# Patient Record
Sex: FEMALE | Race: WHITE | NOT HISPANIC OR LATINO | Employment: OTHER | URBAN - METROPOLITAN AREA
[De-identification: names, ages, dates, MRNs, and addresses within clinical notes are randomized per-mention and may not be internally consistent; named-entity substitution may affect disease eponyms.]

---

## 2017-05-13 ENCOUNTER — HOSPITAL ENCOUNTER (EMERGENCY)
Facility: HOSPITAL | Age: 47
Discharge: HOME | End: 2017-05-13
Attending: EMERGENCY MEDICINE
Payer: MEDICAID

## 2017-05-13 VITALS
DIASTOLIC BLOOD PRESSURE: 62 MMHG | BODY MASS INDEX: 27.77 KG/M2 | OXYGEN SATURATION: 98 % | HEIGHT: 55 IN | RESPIRATION RATE: 18 BRPM | TEMPERATURE: 98 F | SYSTOLIC BLOOD PRESSURE: 129 MMHG | WEIGHT: 120 LBS | HEART RATE: 67 BPM

## 2017-05-13 DIAGNOSIS — T78.40XA ALLERGIC REACTION, INITIAL ENCOUNTER: Primary | ICD-10-CM

## 2017-05-13 PROCEDURE — 99283 EMERGENCY DEPT VISIT LOW MDM: CPT

## 2017-05-13 PROCEDURE — 63600175 PHARM REV CODE 636 W HCPCS: Performed by: EMERGENCY MEDICINE

## 2017-05-13 RX ORDER — PREDNISONE 50 MG/1
50 TABLET ORAL DAILY
Qty: 10 TABLET | Refills: 0 | Status: SHIPPED | OUTPATIENT
Start: 2017-05-13 | End: 2017-05-23

## 2017-05-13 RX ORDER — PREDNISONE 20 MG/1
60 TABLET ORAL
Status: COMPLETED | OUTPATIENT
Start: 2017-05-13 | End: 2017-05-13

## 2017-05-13 RX ADMIN — PREDNISONE 60 MG: 20 TABLET ORAL at 07:05

## 2017-05-13 NOTE — ED AVS SNAPSHOT
[facility] -   [address]  [place] [address]               Penny Pereira   2017  6:56 AM   ED    Description:  Female : 1970   Department:  [facility] -            Your Care was Coordinated By:     Provider Role From To    Wale Luna MD Attending Provider 17 0706 --      Reason for Visit     Allergic Reaction           Diagnoses this Visit        Comments    Allergic reaction, initial encounter    -  Primary       ED Disposition     None           To Do List           Follow-up Information     Follow up with Rob Price MD In 1 day.    Specialty:  Family Medicine    Why:  Patient should return to the ED for any concerns or worsening of condition.    Contact information:     [facility]  [address]  [place] [address]  [phone]         These Medications        Disp Refills Start End    predniSONE (DELTASONE) 50 MG Tab 10 tablet 0 2017    Take 1 tablet (50 mg total) by mouth once daily. - Oral    Pharmacy: [facility] [address] [place] [address] 16 AT [facility] [place] [place] [address] [place] [address] Ph #: [phone]         [facility]     [facility] -  Assistance  Unless otherwise directed by your provider, please contact [facility], our nurse care line that is available for  assistance.     Registered nurses in the [facility] provide: appointment scheduling, clinical advisement, health education, and other advisory services.  Call: [phone] (toll free)               Medications           Message regarding Medications     Verify the changes and/or additions to your medication regime listed below are the same as discussed with your clinician today.  If any of these changes or additions are incorrect, please notify your healthcare provider.        START taking these NEW medications        Refills    predniSONE (DELTASONE) 50 MG Tab 0     "Sig: Take 1 tablet (50 mg total) by mouth once daily.    Class: Print    Route: Oral      These medications were administered today        Dose Freq    predniSONE tablet 60 mg 60 mg ED 1 Time    Sig: Take 3 tablets (60 mg total) by mouth ED 1 Time.    Class: Normal    Route: Oral           Verify that the below list of medications is an accurate representation of the medications you are currently taking.  If none reported, the list may be blank. If incorrect, please contact your healthcare provider. Carry this list with you in case of emergency.           Current Medications     sumatriptan (IMITREX) 50 MG tablet Take 50 mg by mouth daily as needed for Migraine.    predniSONE (DELTASONE) 50 MG Tab Take 1 tablet (50 mg total) by mouth once daily.    predniSONE tablet 60 mg Take 3 tablets (60 mg total) by mouth ED 1 Time.           Clinical Reference Information           Your Vitals Were     BP Pulse Temp Resp Height Weight    142/53 (BP Location: Right arm, Patient Position: Sitting) 72 98.1 °F (36.7 °C) (Oral) 16 4' 6" (1.372 m) 54.4 kg (120 lb)    SpO2 BMI             98% 28.93 kg/m2         Allergies as of 5/13/2017        Reactions    Demerol [Meperidine]     "I'm just sensitive to it."      Immunizations Administered on Date of Encounter - 5/13/2017     None      ED Micro, Lab, POCT     None      ED Imaging Orders     None        Discharge Instructions         Generalized Allergic Reaction (Other)  You are having an allergic reaction. Almost anything can cause one. Different people are allergic to different things. It is usually something that you ate or swallowed, came into contact with by getting or putting it on your skin or clothes, or something you breathed in the air. This can be very annoying and sometimes scary.  Most of us think of allergic reactions when we have a rash or itchy skin. Symptoms can include:  · Rash, hives, redness, welts, blisters  · Itching, burning, stinging, pain  · Dry, flaky, " cracking, scaly skin  · Swelling of the face, lips or other parts of the body  · Hoarse voice  · Trouble swallowing, feeling like your throat is closing  · Trouble breathing, wheezing  · Nausea, vomiting, diarrhea, stomach cramps  · Feeling faint or lightheaded, rapid heart rate  Sometimes the cause may be obvious. However, there are so many things that can cause a reaction that you may not be able to figure out. The most important things to help find your allergen are:  · Remembering when it started  · What you were doing at the time or just before that  · Any activities you were involved in  · Any new products or contacts  Here are some common causes, but remember almost anything can cause a reaction, and you may not even be aware that you came into contact with one of these things.  · Dust, mold, pollen  · Plants, such aspoison ivy and poison oak are common ones, but there are many others  · Animals  · Foods such as shrimp, shellfish, peanuts, milk products, gluten, eggs; also colorings, flavorings, additives  · Insect bites or stings such as bees, mosquitos, flees, ticks  · Medicines such as penicillin, sulfa drugs, amoxicillin, aspirin, ibuprofen; any medicine can cause a reaction  · Jewelry such as nickel, gold (new, or something youve worn for a while including zippers, and buttons)  · Latex such as in gloves, clothes, toys, balloons, or some tapes (some people allergic to latex may also have problems with foods like bananas, avocados, kiwi, papaya, or chestnuts)  · Lotions, perfumes, cosmetics, soaps, shampoos, skincare products, nail products  · Chemicals or dyes in clothing, linen, , hair dyes, soaps, iodine  Many viruses and common colds can cause a rash, but is not an allergic reaction. Sometimes it is hard to tell the difference between allergies, sensitivity and intolerance to something. This is especially true with food. Many things can cause diarrhea, vomiting, stomach cramps, and skin  irritation.  Home care    The goal of our treatment is to help relieve the symptoms, and get you feeling better. The rash will usually fade over several days, but can sometimes last a couple of weeks. Over the next couple of days, there may be times when it is gets a little worse, and then better again. Here are some things to do:  · If you know what you are allergic to, avoid it because future reactions could be worse than this one.  · Avoid tight clothing and anything that heats up your skin (hot showers/baths, direct sunlight) since heat will make itching worse.  · An ice pack will relieve local areas of intense itching and redness. Dont put the ice directly on the skin, because it can damage the skin. You can also ice put it in a plastic bag. Wrap it in something like a thin towel, chacho shirt, or cloth, or use a bag of frozen peas.  · Oral Benadryl (diphenhydramine) is an antihistamine available at drug and grocery stores. Unless a prescription antihistamine was given, Benadryl may be used to reduce itching if large areas of the skin are involved. It may make you sleepy, so be careful using it in the daytime or when going to school, working, or driving. [NOTE: Do not use Benadryl if you have glaucoma or if you are a man with trouble urinating due to an enlarged prostate.] There are antihistamines that causes less drowsiness and are good alternatives for daytime use. Ask your pharmacist for suggestions.  · Do not use Benadryl cream on your skin, because in some people it can cause a further reaction, and make you allergic to Benadryl.  · Try not to scratch. This can tear the skin and cause an infection.  · Using heat-steam to clean your home, using high-efficiency particulate (HEPA) vacuums and filters, avoiding food and pet triggers, exterminating cockroaches, and frequent house cleaning are a few of the strategies used to decrease allergic reactions.  Follow-up care  Follow up with your healthcare provider, or  as advised. If you had a severe reaction today, or if you have had several mild-moderate allergic reactions in the past, ask your doctor about allergy testing to find out what you are allergic to. If your reaction included dizziness, fainting or trouble breathing or swallowing, ask your doctor about carrying injectable epinephrine for home use.  Call 911  Call 911 if any of these occur:  · Trouble breathing or swallowing, wheezing  · Hoarse voice or trouble speaking  · Confused  · Very drowsy or trouble awakening  · Fainting or loss of consciousness  · Rapid heart rate  · Low blood pressure  · Feeling of doom  · Nausea, vomiting, abdominal pain, diarrhea  · Vomiting blood, or large amounts of blood in stool  · Seizure  When to seek medical advice  Call your healthcare provider right away if any of these occur:  · Spreading areas of itching, redness or swelling  · New or worse swelling in the face, eyelids, lips, mouth, throat or tongue  · Dizziness, weakness  · Signs of infection:  ¨ Spreading redness  ¨ Increased pain or swelling  ¨ Fever (1 degree above your normal temperature) lasting for 24 to 48 hours  Date Last Reviewed: 7/30/2015  © 5720-6515 [employer]. [address] [place] [address]. All rights reserved. This information is not intended as a substitute for professional medical care. Always follow your healthcare professional's instructions.          [facility] Sign-Up     Activating your [facility] account is as easy as 1-2-3!     1) Visit [email or web address], select Sign Up Now, enter this activation code and your date of birth, then select Next.  [email or web address]  Expires: 6/27/2017  7:13 AM      2) Create a username and password to use when you visit [facility] in the future and select a security question in case you lose your password and select Next.    3) Enter your e-mail address and click Sign Up!    Additional Information  If you have questions, please e-mail  [email or web address] or call [phone] to talk to our [facility] staff. Remember, [facility] is NOT to be used for urgent needs. For medical emergencies, dial 911.          [facility] complies with applicable Federal civil rights laws and does not discriminate on the basis of race, color, national origin, age, disability, or sex.        Language Assistance Services     ATTENTION: Language assistance services are available, free of charge. Please call [phone].      ATENCIÓN: Si habla español, tiene a ritchie disposición servicios gratuitos de asistencia lingüística. Llame al [phone].     CHÚ Ý: N?u b?n nói Ti?ng Vi?t, có các d?ch v? h? tr? ngôn ng? mi?n phí dành cho b?n. G?i s? [phone].

## 2017-05-13 NOTE — DISCHARGE INSTRUCTIONS
Generalized Allergic Reaction (Other)  You are having an allergic reaction. Almost anything can cause one. Different people are allergic to different things. It is usually something that you ate or swallowed, came into contact with by getting or putting it on your skin or clothes, or something you breathed in the air. This can be very annoying and sometimes scary.  Most of us think of allergic reactions when we have a rash or itchy skin. Symptoms can include:  · Rash, hives, redness, welts, blisters  · Itching, burning, stinging, pain  · Dry, flaky, cracking, scaly skin  · Swelling of the face, lips or other parts of the body  · Hoarse voice  · Trouble swallowing, feeling like your throat is closing  · Trouble breathing, wheezing  · Nausea, vomiting, diarrhea, stomach cramps  · Feeling faint or lightheaded, rapid heart rate  Sometimes the cause may be obvious. However, there are so many things that can cause a reaction that you may not be able to figure out. The most important things to help find your allergen are:  · Remembering when it started  · What you were doing at the time or just before that  · Any activities you were involved in  · Any new products or contacts  Here are some common causes, but remember almost anything can cause a reaction, and you may not even be aware that you came into contact with one of these things.  · Dust, mold, pollen  · Plants, such aspoison ivy and poison oak are common ones, but there are many others  · Animals  · Foods such as shrimp, shellfish, peanuts, milk products, gluten, eggs; also colorings, flavorings, additives  · Insect bites or stings such as bees, mosquitos, flees, ticks  · Medicines such as penicillin, sulfa drugs, amoxicillin, aspirin, ibuprofen; any medicine can cause a reaction  · Jewelry such as nickel, gold (new, or something youve worn for a while including zippers, and buttons)  · Latex such as in gloves, clothes, toys, balloons, or some tapes (some people  allergic to latex may also have problems with foods like bananas, avocados, kiwi, papaya, or chestnuts)  · Lotions, perfumes, cosmetics, soaps, shampoos, skincare products, nail products  · Chemicals or dyes in clothing, linen, , hair dyes, soaps, iodine  Many viruses and common colds can cause a rash, but is not an allergic reaction. Sometimes it is hard to tell the difference between allergies, sensitivity and intolerance to something. This is especially true with food. Many things can cause diarrhea, vomiting, stomach cramps, and skin irritation.  Home care    The goal of our treatment is to help relieve the symptoms, and get you feeling better. The rash will usually fade over several days, but can sometimes last a couple of weeks. Over the next couple of days, there may be times when it is gets a little worse, and then better again. Here are some things to do:  · If you know what you are allergic to, avoid it because future reactions could be worse than this one.  · Avoid tight clothing and anything that heats up your skin (hot showers/baths, direct sunlight) since heat will make itching worse.  · An ice pack will relieve local areas of intense itching and redness. Dont put the ice directly on the skin, because it can damage the skin. You can also ice put it in a plastic bag. Wrap it in something like a thin towel, chacho shirt, or cloth, or use a bag of frozen peas.  · Oral Benadryl (diphenhydramine) is an antihistamine available at drug and grocery stores. Unless a prescription antihistamine was given, Benadryl may be used to reduce itching if large areas of the skin are involved. It may make you sleepy, so be careful using it in the daytime or when going to school, working, or driving. [NOTE: Do not use Benadryl if you have glaucoma or if you are a man with trouble urinating due to an enlarged prostate.] There are antihistamines that causes less drowsiness and are good alternatives for daytime use. Ask  your pharmacist for suggestions.  · Do not use Benadryl cream on your skin, because in some people it can cause a further reaction, and make you allergic to Benadryl.  · Try not to scratch. This can tear the skin and cause an infection.  · Using heat-steam to clean your home, using high-efficiency particulate (HEPA) vacuums and filters, avoiding food and pet triggers, exterminating cockroaches, and frequent house cleaning are a few of the strategies used to decrease allergic reactions.  Follow-up care  Follow up with your healthcare provider, or as advised. If you had a severe reaction today, or if you have had several mild-moderate allergic reactions in the past, ask your doctor about allergy testing to find out what you are allergic to. If your reaction included dizziness, fainting or trouble breathing or swallowing, ask your doctor about carrying injectable epinephrine for home use.  Call 911  Call 911 if any of these occur:  · Trouble breathing or swallowing, wheezing  · Hoarse voice or trouble speaking  · Confused  · Very drowsy or trouble awakening  · Fainting or loss of consciousness  · Rapid heart rate  · Low blood pressure  · Feeling of doom  · Nausea, vomiting, abdominal pain, diarrhea  · Vomiting blood, or large amounts of blood in stool  · Seizure  When to seek medical advice  Call your healthcare provider right away if any of these occur:  · Spreading areas of itching, redness or swelling  · New or worse swelling in the face, eyelids, lips, mouth, throat or tongue  · Dizziness, weakness  · Signs of infection:  ¨ Spreading redness  ¨ Increased pain or swelling  ¨ Fever (1 degree above your normal temperature) lasting for 24 to 48 hours  Date Last Reviewed: 7/30/2015  © 7991-5881 [employer]. [address] [place] [address]. All rights reserved. This information is not intended as a substitute for professional medical care. Always follow your healthcare professional's  instructions.

## 2017-05-13 NOTE — ED PROVIDER NOTES
"SCRIBE #1 NOTE: I, Cosmo Irwin, am scribing for, and in the presence of, Wale Luna MD. I have scribed the entire note.      History      Chief Complaint   Patient presents with    Allergic Reaction     started last night with hives to both arms, both legs, and chest, itching, has not taken any medication for symptoms.       Review of patient's allergies indicates:   Allergen Reactions    Demerol [meperidine]      "I'm just sensitive to it."        HPI   HPI    2017, 7:12 AM   History obtained from the patient      History of Present Illness: Penny Pereira is a 47 y.o. female patient who presents to the Emergency Department for allergic reaction which onset gradually yesterday. Pt reports she ate at [employer] yesterday, which she does not normally do. States that she went to [place] afterwards, and felt like "something was biting her hands". Pt is c/o rash to BLE, BUE, stomach, and back. Symptoms are constant and moderate in severity.  No mitigating or exacerbating factors reported. Associated sxs include itching. Patient denies any fever, chills, trouble swallowing/breathing, tongue swelling, facial swelling, CP, SOB, N/V/D, weakness/numbness, dizziness, and all other sxs at this time. No prior tx. No further complaints or concerns at this time.       Arrival mode: Personal vehicle      PCP: Rob Price MD       Past Medical History:  Past Medical History:   Diagnosis Date    Migraine headache     Osteogenesis imperfecta        Past Surgical History:  Past Surgical History:   Procedure Laterality Date    BRAIN SURGERY       SECTION      x 3    HYSTERECTOMY      LEG SURGERY Bilateral          Family History:  History reviewed. No pertinent family history.    Social History:  Social History     Social History Main Topics    Smoking status: Never Smoker    Smokeless tobacco: Not given    Alcohol use No    Drug use: No    Sexual activity: Not given       ROS   Review of " Systems   Constitutional: Negative for chills and fever.   HENT: Negative for facial swelling, sore throat and trouble swallowing.         (-) tongue swelling   Respiratory: Negative for shortness of breath.    Cardiovascular: Negative for chest pain.   Gastrointestinal: Negative for nausea.   Genitourinary: Negative for dysuria.   Musculoskeletal: Negative for back pain.   Skin: Positive for rash (to BLE, BUE, and trunk).        (+) itching   Neurological: Negative for dizziness, weakness and numbness.   Hematological: Does not bruise/bleed easily.   All other systems reviewed and are negative.    Physical Exam    Initial Vitals   BP Pulse Resp Temp SpO2   05/13/17 0653 05/13/17 0653 05/13/17 0653 05/13/17 0653 05/13/17 0653   142/53 72 16 98.1 °F (36.7 °C) 98 %      Physical Exam  Nursing Notes and Vital Signs Reviewed.  Constitutional: Patient is in no acute distress. Awake and alert. Well-developed and well-nourished.  Head: Atraumatic. Normocephalic.  Eyes: PERRL. EOM intact. Conjunctivae are not pale. No scleral icterus.  ENT: Mucous membranes are moist. Oropharynx is clear and symmetric.  Tongue is normal size. No oropharyngeal edema. Patient is able to handle secretions normally.   Neck: Supple. Full ROM. No lymphadenopathy.  Cardiovascular: Regular rate. Regular rhythm. No murmurs, rubs, or gallops. Distal pulses are 2+ and symmetric.  Pulmonary/Chest: No respiratory distress. Clear to auscultation bilaterally. No wheezing, rales, or rhonchi.  Abdominal: Soft and non-distended.  There is no tenderness.  No rebound, guarding, or rigidity. Good bowel sounds.  Musculoskeletal: Moves all extremities. No obvious deformities. No edema. No calf tenderness.  Skin: Warm and dry. Pruritic, slightly raised, blanching, erythematic urticaria rash to BLE, BUE, trunk, and back.   Neurological:  Alert, awake, and appropriate.  Normal speech.  No acute focal neurological deficits are appreciated.  Psychiatric: Normal  "affect. Good eye contact. Appropriate in content.    ED Course    Procedures  ED Vital Signs:  Vitals:    05/13/17 0653   BP: (!) 142/53   Pulse: 72   Resp: 16   Temp: 98.1 °F (36.7 °C)   TempSrc: Oral   SpO2: 98%   Weight: 54.4 kg (120 lb)   Height: 4' 6" (1.372 m)              The Emergency Provider reviewed the vital signs and test results, which are outlined above.    ED Discussion     7:25 AM: Reevaluated patient. Pt is AAO x3, in NAD, and VSS. Pt reports itching has improved. Discussed with pt all pertinent ED information. Discussed pt dx and plan of tx. Gave pt all f/u and return to the ED instructions. All questions and concerns were addressed at this time. Pt expresses understanding of information and instructions, and is comfortable with plan to discharge. Pt is stable for discharge.    Pre-hypertension/Hypertension: The pt has been informed that they may have pre-hypertension or hypertension based on a blood pressure reading in the ED. I recommend that the pt call the PCP listed on their discharge instructions or a physician of their choice this week to arrange f/u for further evaluation of possible pre-hypertension or hypertension.     Patient is safe for discharge. There is no suggestion of airway or ENT emergency. Patient is hemodynamically stable and there is no suggestion of active anaphylaxis or progressive worsening of current symptoms.    ED Medication(s):  Medications   predniSONE tablet 60 mg (not administered)       New Prescriptions    PREDNISONE (DELTASONE) 50 MG TAB    Take 1 tablet (50 mg total) by mouth once daily.       Follow-up Information     Follow up with Rob Price MD In 1 day.    Specialty:  Family Medicine    Why:  Patient should return to the ED for any concerns or worsening of condition.    Contact information:    [address] [facility]  [address]  [place] [address]  [phone]              Medical Decision Making              Scribe Attestation:   Scribe #1: I performed the " above scribed service and the documentation accurately describes the services I performed. I attest to the accuracy of the note.    Attending:   Physician Attestation Statement for Scribe #1: I, Wale Luna MD, personally performed the services described in this documentation, as scribed by Cosmo Irwin, in my presence, and it is both accurate and complete.          Clinical Impression       ICD-10-CM ICD-9-CM   1. Allergic reaction, initial encounter T78.40XA 995.3       Disposition:   Disposition: Discharged  Condition: Stable         Wale Luna MD  05/13/17 7317

## 2017-07-16 ENCOUNTER — HOSPITAL ENCOUNTER (EMERGENCY)
Facility: HOSPITAL | Age: 47
Discharge: HOME | End: 2017-07-16
Attending: EMERGENCY MEDICINE
Payer: MEDICAID

## 2017-07-16 VITALS
HEART RATE: 78 BPM | SYSTOLIC BLOOD PRESSURE: 141 MMHG | RESPIRATION RATE: 16 BRPM | OXYGEN SATURATION: 99 % | TEMPERATURE: 98 F | BODY MASS INDEX: 30.09 KG/M2 | DIASTOLIC BLOOD PRESSURE: 67 MMHG | WEIGHT: 130 LBS | HEIGHT: 55 IN

## 2017-07-16 DIAGNOSIS — K04.7 DENTAL ABSCESS: Primary | ICD-10-CM

## 2017-07-16 LAB
ANION GAP SERPL CALC-SCNC: 11 MMOL/L
BASOPHILS # BLD AUTO: 0.01 K/UL
BASOPHILS NFR BLD: 0.1 %
BUN SERPL-MCNC: 6 MG/DL
CALCIUM SERPL-MCNC: 9 MG/DL
CHLORIDE SERPL-SCNC: 106 MMOL/L
CO2 SERPL-SCNC: 25 MMOL/L
CREAT SERPL-MCNC: 0.6 MG/DL
DIFFERENTIAL METHOD: ABNORMAL
EOSINOPHIL # BLD AUTO: 0.2 K/UL
EOSINOPHIL NFR BLD: 2.2 %
ERYTHROCYTE [DISTWIDTH] IN BLOOD BY AUTOMATED COUNT: 13.8 %
EST. GFR  (AFRICAN AMERICAN): >60 ML/MIN/1.73 M^2
EST. GFR  (NON AFRICAN AMERICAN): >60 ML/MIN/1.73 M^2
GLUCOSE SERPL-MCNC: 107 MG/DL
HCT VFR BLD AUTO: 36.5 %
HGB BLD-MCNC: 12.7 G/DL
LYMPHOCYTES # BLD AUTO: 1.7 K/UL
LYMPHOCYTES NFR BLD: 19.1 %
MCH RBC QN AUTO: 28.8 PG
MCHC RBC AUTO-ENTMCNC: 34.8 %
MCV RBC AUTO: 83 FL
MONOCYTES # BLD AUTO: 0.5 K/UL
MONOCYTES NFR BLD: 6.1 %
NEUTROPHILS # BLD AUTO: 6.2 K/UL
NEUTROPHILS NFR BLD: 72.5 %
PLATELET # BLD AUTO: 241 K/UL
PMV BLD AUTO: 9.8 FL
POTASSIUM SERPL-SCNC: 3.3 MMOL/L
RBC # BLD AUTO: 4.41 M/UL
SODIUM SERPL-SCNC: 142 MMOL/L
WBC # BLD AUTO: 8.62 K/UL

## 2017-07-16 PROCEDURE — 80048 BASIC METABOLIC PNL TOTAL CA: CPT

## 2017-07-16 PROCEDURE — 96365 THER/PROPH/DIAG IV INF INIT: CPT

## 2017-07-16 PROCEDURE — 99284 EMERGENCY DEPT VISIT MOD MDM: CPT | Mod: 25

## 2017-07-16 PROCEDURE — 96375 TX/PRO/DX INJ NEW DRUG ADDON: CPT

## 2017-07-16 PROCEDURE — 85025 COMPLETE CBC W/AUTO DIFF WBC: CPT

## 2017-07-16 PROCEDURE — 25000003 PHARM REV CODE 250: Performed by: EMERGENCY MEDICINE

## 2017-07-16 PROCEDURE — 63600175 PHARM REV CODE 636 W HCPCS: Performed by: EMERGENCY MEDICINE

## 2017-07-16 PROCEDURE — 25500020 PHARM REV CODE 255: Performed by: EMERGENCY MEDICINE

## 2017-07-16 RX ORDER — DEXAMETHASONE SODIUM PHOSPHATE 4 MG/ML
10 INJECTION, SOLUTION INTRA-ARTICULAR; INTRALESIONAL; INTRAMUSCULAR; INTRAVENOUS; SOFT TISSUE
Status: COMPLETED | OUTPATIENT
Start: 2017-07-16 | End: 2017-07-16

## 2017-07-16 RX ORDER — CLINDAMYCIN PHOSPHATE 900 MG/50ML
900 INJECTION, SOLUTION INTRAVENOUS
Status: COMPLETED | OUTPATIENT
Start: 2017-07-16 | End: 2017-07-16

## 2017-07-16 RX ORDER — ONDANSETRON 4 MG/1
4 TABLET, FILM COATED ORAL EVERY 8 HOURS PRN
Qty: 12 TABLET | Refills: 0 | Status: SHIPPED | OUTPATIENT
Start: 2017-07-16 | End: 2018-10-22 | Stop reason: CLARIF

## 2017-07-16 RX ORDER — HYDROCODONE BITARTRATE AND ACETAMINOPHEN 7.5; 325 MG/1; MG/1
1 TABLET ORAL EVERY 6 HOURS PRN
Qty: 12 TABLET | Refills: 0 | Status: SHIPPED | OUTPATIENT
Start: 2017-07-16 | End: 2017-07-26

## 2017-07-16 RX ORDER — CLINDAMYCIN HYDROCHLORIDE 300 MG/1
300 CAPSULE ORAL EVERY 6 HOURS
COMMUNITY
End: 2018-10-22 | Stop reason: CLARIF

## 2017-07-16 RX ADMIN — DEXAMETHASONE SODIUM PHOSPHATE 10 MG: 4 INJECTION, SOLUTION INTRAMUSCULAR; INTRAVENOUS at 11:07

## 2017-07-16 RX ADMIN — IOHEXOL 75 ML: 350 INJECTION, SOLUTION INTRAVENOUS at 01:07

## 2017-07-16 RX ADMIN — CLINDAMYCIN IN 5 PERCENT DEXTROSE 900 MG: 18 INJECTION, SOLUTION INTRAVENOUS at 11:07

## 2017-07-16 NOTE — ED NOTES
Pt states she has had an infected wisdom tooth for the past 4 days, states there is swelling into her jaw and neck. Pt states she has been waking up sweating  And with nausea and vomiting.

## 2017-07-16 NOTE — ED PROVIDER NOTES
"SCRIBE #1 NOTE: I, Cinthia Jaiden, am scribing for, and in the presence of, Jimena Sung MD. I have scribed the entire note.      History      Chief Complaint   Patient presents with    Abscess     dental abscess x 2 days with worsening pain and pain with swallowing       Review of patient's allergies indicates:   Allergen Reactions    Demerol [meperidine]      "I'm just sensitive to it."        HPI   HPI    2017, 11:19 AM   History obtained from the patient      History of Present Illness: Penny Pereira is a 47 y.o. female patient who presents to the Emergency Department for dental abscess which onset gradually 1 day ago. Symptoms are constant and moderate in severity. Pt reports that she is currently being treated for a dental abscess by her PCP. Pt reports that she is taking clindamycin. Pt reports pain with trying to open her mouth. Pt reports that she noticed mild facial swelling. Pt reports that she does not have a dentist. Pt reports that she vomited a couple times after taking the clindamycin. No mitigating or exacerbating factors reported. Patient denies any fever, trouble swallowing, congestion, sore throat, SOB, drooling, and all other sxs at this time. No further complaints or concerns at this time.         Arrival mode: Personal vehicle    PCP: Rob Price MD       Past Medical History:  Past Medical History:   Diagnosis Date    Migraine headache     Osteogenesis imperfecta        Past Surgical History:  Past Surgical History:   Procedure Laterality Date    BRAIN SURGERY       SECTION      x 3    HYSTERECTOMY      LEG SURGERY Bilateral          Family History:  History reviewed. No pertinent family history.    Social History:  Social History     Social History Main Topics    Smoking status: Never Smoker    Smokeless tobacco: Never Used    Alcohol use No    Drug use: No    Sexual activity: Not given       ROS   Review of Systems   Constitutional: Negative for " fever.   HENT: Positive for dental problem and facial swelling. Negative for drooling, ear pain, sore throat, trouble swallowing and voice change.    Respiratory: Negative for shortness of breath.    Cardiovascular: Negative for chest pain.   Gastrointestinal: Negative for nausea.   Genitourinary: Negative for dysuria.   Musculoskeletal: Negative for back pain.   Skin: Negative for rash.   Neurological: Negative for weakness.   Hematological: Does not bruise/bleed easily.       Physical Exam      Initial Vitals [07/16/17 1053]   BP Pulse Resp Temp SpO2   (!) 150/64 84 18 98.2 °F (36.8 °C) 96 %      MAP       92.67          Physical Exam  Nursing Notes and Vital Signs Reviewed.  Constitutional: Patient is in no apparent distress.   Head: Atraumatic. Normocephalic. Mild R sided facial swelling.  No obvious facial trauma.  Eyes:  No periorbital swelling. PERRL. EOM intact. Conjunctivae are not pale. No scleral icterus.  ENT: Airway is patent. No drooling. No palpable submandibular lymphadenopathy. Mucous membranes are moist. Patient is refusing to fully open her mouth, unable to fully examine oral cavity.   Neck: Supple. Full ROM. No lymphadenopathy.  Cardiovascular: Regular rate. Regular rhythm. No murmurs, rubs, or gallops. Distal pulses are 2+ and symmetric.  Pulmonary/Chest: No respiratory distress. Clear to auscultation bilaterally. No wheezing, rales, or rhonchi.  Abdominal: Soft and non-distended.  There is no tenderness.  No rebound, guarding, or rigidity. Good bowel sounds.  Genitourinary: No CVA tenderness  Musculoskeletal: Moves all extremities. No obvious deformities. No edema. No calf tenderness.  Skin: Warm and dry.  Neurological:  Alert, awake, and appropriate.  Normal speech.  No acute focal neurological deficits are appreciated.  Psychiatric: Normal affect. Good eye contact. Appropriate in content.    ED Course    Procedures  ED Vital Signs:  Vitals:    07/16/17 1053 07/16/17 1346   BP: (!) 150/64 (!)  "141/67   Pulse: 84 78   Resp: 18 16   Temp: 98.2 °F (36.8 °C)    TempSrc: Oral    SpO2: 96% 99%   Weight: 59 kg (130 lb)    Height: 4' 6" (1.372 m)        Abnormal Lab Results:  Labs Reviewed   CBC W/ AUTO DIFFERENTIAL - Abnormal; Notable for the following:        Result Value    Hematocrit 36.5 (*)     All other components within normal limits   BASIC METABOLIC PANEL - Abnormal; Notable for the following:     Potassium 3.3 (*)     All other components within normal limits        All Lab Results:  Results for orders placed or performed during the hospital encounter of 07/16/17   CBC auto differential   Result Value Ref Range    WBC 8.62 3.90 - 12.70 K/uL    RBC 4.41 4.00 - 5.40 M/uL    Hemoglobin 12.7 12.0 - 16.0 g/dL    Hematocrit 36.5 (L) 37.0 - 48.5 %    MCV 83 82 - 98 fL    MCH 28.8 27.0 - 31.0 pg    MCHC 34.8 32.0 - 36.0 %    RDW 13.8 11.5 - 14.5 %    Platelets 241 150 - 350 K/uL    MPV 9.8 9.2 - 12.9 fL    Gran # 6.2 1.8 - 7.7 K/uL    Lymph # 1.7 1.0 - 4.8 K/uL    Mono # 0.5 0.3 - 1.0 K/uL    Eos # 0.2 0.0 - 0.5 K/uL    Baso # 0.01 0.00 - 0.20 K/uL    Gran% 72.5 38.0 - 73.0 %    Lymph% 19.1 18.0 - 48.0 %    Mono% 6.1 4.0 - 15.0 %    Eosinophil% 2.2 0.0 - 8.0 %    Basophil% 0.1 0.0 - 1.9 %    Differential Method Automated    Basic metabolic panel   Result Value Ref Range    Sodium 142 136 - 145 mmol/L    Potassium 3.3 (L) 3.5 - 5.1 mmol/L    Chloride 106 95 - 110 mmol/L    CO2 25 23 - 29 mmol/L    Glucose 107 70 - 110 mg/dL    BUN, Bld 6 6 - 20 mg/dL    Creatinine 0.6 0.5 - 1.4 mg/dL    Calcium 9.0 8.7 - 10.5 mg/dL    Anion Gap 11 8 - 16 mmol/L    eGFR if African American >60 >60 mL/min/1.73 m^2    eGFR if non African American >60 >60 mL/min/1.73 m^2         Imaging Results:  Imaging Results          CT Maxillofacial With Contrast (Final result)  Result time 07/16/17 13:38:20    Final result by Giuseppe Boyce III, MD (07/16/17 13:38:20)                 Impression:        1. Detail about the oral pharynx " grossly limited by streak artifact from dental fillings. Further detail of this region could be better obtained with a Panorex if clinically indicated.    2. Suspected odontogenic abscess medially and posteriorly on the right as described adjacent to the dorsal molars along the region of the mandible measuring 1.2 cm in diameter. Followup recommended      Electronically signed by: ALEX VELEZ MD  Date:     07/16/17  Time:    13:38              Narrative:    CT of the maxillofacial/sinus region with contrast.    Clinical indication: Facial pain and swelling.    Exam is limited due to the extreme streak artifact from dental fillings.    The globes are grossly intact. No acute/depressed/displaced fracture or    Dental caries noted bilaterally involving the third lower molars but this is severely streaked by artifact. On the right there is a moderately well-defined focus of low attenuation medially in the soft tissues with peripheral enhancement measuring up to 1.2 cm in diameter consistent with an abscess. No additional significant findings.                                      The Emergency Provider reviewed the vital signs and test results, which are outlined above.    ED Discussion     1:51 PM: Reassessed pt at this time. Pt is instructed to have a dental follow up tomorrow. Pt has no significant facial swelling at this time. No swelling to the floor of the mouth. Discussed with pt all pertinent ED information and results. Discussed pt dx and plan of tx. Gave pt all f/u and return to the ED instructions. All questions and concerns were addressed at this time. Pt expresses understanding of information and instructions, and is comfortable with plan to discharge. Pt is stable for discharge.    I discussed with patient and/or family/caretaker that evaluation in the ED does not suggest any emergent or life threatening medical conditions requiring immediate intervention beyond what was provided in the ED, and I  believe patient is safe for discharge.  Regardless, an unremarkable evaluation in the ED does not preclude the development or presence of a serious of life threatening condition. As such, patient was instructed to return immediately for any worsening or change in current symptoms.        ED Medication(s):  Medications   clindamycin 900 MG/50 ML D5W 900 mg/50 mL IVPB 900 mg (0 mg Intravenous Stopped 7/16/17 1250)   dexamethasone injection 10 mg (10 mg Intravenous Given 7/16/17 1145)   omnipaque 350 iohexol 75 mL (75 mLs Intravenous Given 7/16/17 1327)       Discharge Medication List as of 7/16/2017  1:51 PM      START taking these medications    Details   hydrocodone-acetaminophen 7.5-325mg (NORCO) 7.5-325 mg per tablet Take 1 tablet by mouth every 6 (six) hours as needed., Starting Sun 7/16/2017, Until Wed 7/26/2017, Print             Follow-up Information     Local Dentist. Schedule an appointment as soon as possible for a visit in 1 day.    Why:  Return to the Emergency Room, If symptoms worsen                   Medical Decision Making              Scribe Attestation:   Scribe #1: I performed the above scribed service and the documentation accurately describes the services I performed. I attest to the accuracy of the note.    Attending:   Physician Attestation Statement for Scribe #1: I, Jimena Sung MD, personally performed the services described in this documentation, as scribed by Cinthia Hwang, in my presence, and it is both accurate and complete.          Clinical Impression       ICD-10-CM ICD-9-CM   1. Dental abscess K04.7 522.5       Disposition:   Disposition: Discharged  Condition: Stable         Jimena Sung MD  07/18/17 2159

## 2018-02-03 ENCOUNTER — HOSPITAL ENCOUNTER (EMERGENCY)
Facility: HOSPITAL | Age: 48
Discharge: HOME | End: 2018-02-03
Payer: MEDICAID

## 2018-02-03 VITALS
HEIGHT: 55 IN | SYSTOLIC BLOOD PRESSURE: 170 MMHG | TEMPERATURE: 98 F | WEIGHT: 130 LBS | HEART RATE: 70 BPM | DIASTOLIC BLOOD PRESSURE: 67 MMHG | BODY MASS INDEX: 30.09 KG/M2 | OXYGEN SATURATION: 97 % | RESPIRATION RATE: 18 BRPM

## 2018-02-03 DIAGNOSIS — H57.12 EYE PAIN, LEFT: ICD-10-CM

## 2018-02-03 DIAGNOSIS — H10.212 CHEMICAL CONJUNCTIVITIS OF LEFT EYE: ICD-10-CM

## 2018-02-03 DIAGNOSIS — H10.89: Primary | ICD-10-CM

## 2018-02-03 PROCEDURE — 25000003 PHARM REV CODE 250: Performed by: REGISTERED NURSE

## 2018-02-03 PROCEDURE — 99283 EMERGENCY DEPT VISIT LOW MDM: CPT

## 2018-02-03 RX ORDER — ERYTHROMYCIN 5 MG/G
OINTMENT OPHTHALMIC
Status: COMPLETED | OUTPATIENT
Start: 2018-02-03 | End: 2018-02-03

## 2018-02-03 RX ORDER — NAPROXEN 375 MG/1
375 TABLET ORAL 2 TIMES DAILY WITH MEALS
Qty: 20 TABLET | Refills: 0 | Status: SHIPPED | OUTPATIENT
Start: 2018-02-03

## 2018-02-03 RX ORDER — ERYTHROMYCIN 5 MG/G
OINTMENT OPHTHALMIC
Qty: 1 TUBE | Refills: 0 | Status: SHIPPED | OUTPATIENT
Start: 2018-02-03 | End: 2018-10-22 | Stop reason: CLARIF

## 2018-02-03 RX ORDER — TETRACAINE HYDROCHLORIDE 5 MG/ML
2 SOLUTION OPHTHALMIC
Status: COMPLETED | OUTPATIENT
Start: 2018-02-03 | End: 2018-02-03

## 2018-02-03 RX ADMIN — ERYTHROMYCIN 1 INCH: 5 OINTMENT OPHTHALMIC at 01:02

## 2018-02-03 RX ADMIN — TETRACAINE HYDROCHLORIDE 2 DROP: 5 SOLUTION OPHTHALMIC at 01:02

## 2018-02-03 RX ADMIN — FLUORESCEIN SODIUM 1 EACH: 1 STRIP OPHTHALMIC at 12:02

## 2018-02-03 NOTE — ED NOTES
Bed: 22  Expected date:   Expected time:   Means of arrival:   Comments:     Marquez Jordan RN  02/03/18 0043

## 2018-02-03 NOTE — ED PROVIDER NOTES
"     History      Chief Complaint   Patient presents with    Eye Pain     left eye irritation and pain after getting shampoo in eye aboutone hour pta       Review of patient's allergies indicates:   Allergen Reactions    Demerol [meperidine]      "I'm just sensitive to it."        HPI   HPI    2/3/2018, 12:40 AM   History obtained from the patient      History of Present Illness: Penny Pereira is a 47 y.o. female patient who presents to the Emergency Department for L eye irritation after getting shampoo in eye approximately 1 hour ago. Symptoms are constant and moderate in severity. No mitigating or exacerbating factors reported. Associated sxs include eye pain and redness. Patient denies vision loss, nausea, and all other sxs at this time. Prior Tx includes rinsing of eye. Pt states that symptoms have improved since she has been here. No further complaints or concerns at this time.         Arrival mode: Personal vehicle     PCP: Rob Price MD       Past Medical History:  Past Medical History:   Diagnosis Date    Migraine headache     Osteogenesis imperfecta        Past Surgical History:  Past Surgical History:   Procedure Laterality Date    BRAIN SURGERY       SECTION      x 3    HYSTERECTOMY      LEG SURGERY Bilateral          Family History:  History reviewed. No pertinent family history.    Social History:  Social History     Social History Main Topics    Smoking status: Never Smoker    Smokeless tobacco: Never Used    Alcohol use No    Drug use: No    Sexual activity: Not on file       ROS   Review of Systems   Constitutional: Negative for fever.   HENT: Negative for sore throat.    Eyes: Positive for pain and redness.   Respiratory: Negative for shortness of breath.    Cardiovascular: Negative for chest pain.   Gastrointestinal: Negative for nausea.   Genitourinary: Negative for dysuria.   Musculoskeletal: Negative for back pain.   Skin: Negative for rash.   Neurological: " "Negative for weakness.   Hematological: Does not bruise/bleed easily.   All other systems reviewed and are negative.      Physical Exam      Initial Vitals [02/03/18 0009]   BP Pulse Resp Temp SpO2   (!) 170/67 70 18 98.2 °F (36.8 °C) 97 %      MAP       101.33          Physical Exam  Nursing Notes and Vital Signs Reviewed.  Constitutional: Patient is in no acute distress. Well-developed and well-nourished.  Head: Atraumatic. Normocephalic.  Eyes: External: Lids are normal without edema or erythema. L conjunctival injection, R normal. Normal sclera. No drainage. No proptosis.   EOM: Normal. Conjugate gaze.   Pupils: PERRL. No photophobia.  Visual fields are intact.  Funduscopic exam: Clear anterior chamber. No hyphema. No papilledema. Disc margins sharp.   Fluorescein Uptake: Examined L eye: Fluorescein strip was used. Fluorescein uptake noted to medial aspect of L eye, cornea showed no fluorescein uptake.  ENT: Mucous membranes are moist. Oropharynx is clear and symmetric.    Neck: Supple. Full ROM. No lymphadenopathy.  Cardiovascular: Regular rate. Regular rhythm. No murmurs, rubs, or gallops. Distal pulses are 2+ and symmetric.  Pulmonary/Chest: No respiratory distress. Clear to auscultation bilaterally. No wheezing or rales.  Abdominal: Soft and non-distended.  There is no tenderness.  No rebound, guarding, or rigidity. Good bowel sounds.  Genitourinary: No CVA tenderness  Musculoskeletal: Moves all extremities. No obvious deformities. No edema. No calf tenderness.  Skin: Warm and dry.  Neurological:  Alert, awake, and appropriate.  Normal speech.  No acute focal neurological deficits are appreciated.  Psychiatric: Normal affect. Good eye contact. Appropriate in content.    ED Course    Procedures  ED Vital Signs:  Vitals:    02/03/18 0009   BP: (!) 170/67   Pulse: 70   Resp: 18   Temp: 98.2 °F (36.8 °C)   TempSrc: Oral   SpO2: 97%   Weight: 59 kg (130 lb)   Height: 4' 6" (1.372 m)       Abnormal Lab " Results:  Labs Reviewed - No data to display     All Lab Results:      Imaging Results:  Imaging Results    None                 The Emergency Provider reviewed the vital signs and test results, which are outlined above.    ED Discussion     1:22 AM: Reassessed pt at this time.  Pt states her condition has improved at this time. Discussed with pt all pertinent ED information and results. Discussed pt dx and plan of tx. Gave pt all f/u and return to the ED instructions. All questions and concerns were addressed at this time. Pt expresses understanding of information and instructions, and is comfortable with plan to discharge. Pt is stable for discharge.        ED Medication(s):  Medications   erythromycin 5 mg/gram (0.5 %) ophthalmic ointment (not administered)   fluorescein ophthalmic strip 1 each (1 each Left Eye Given by Other 2/3/18 0045)   tetracaine HCl (PF) 0.5 % Drop 2 drop (2 drops Left Eye Given 2/3/18 0101)       New Prescriptions    ERYTHROMYCIN (ROMYCIN) OPHTHALMIC OINTMENT    Place a 1/2 inch ribbon of ointment into the lower eyelid of L eye three times per day for 7 days.    NAPROXEN (NAPROSYN) 375 MG TABLET    Take 1 tablet (375 mg total) by mouth 2 (two) times daily with meals.       Follow-up Information     Opthamology In 3 days.                   Medical Decision Making                Clinical Impression       ICD-10-CM ICD-9-CM   1. Conjunctival mucous membrane ulceration H10.89 372.00   2. Eye pain, left H57.12 379.91   3. Chemical conjunctivitis of left eye H10.212 372.06               Gerald March Jr., FNP  02/03/18 5949

## 2018-10-22 ENCOUNTER — HOSPITAL ENCOUNTER (EMERGENCY)
Facility: HOSPITAL | Age: 48
Discharge: HOME | End: 2018-10-22
Attending: EMERGENCY MEDICINE
Payer: MEDICAID

## 2018-10-22 VITALS
BODY MASS INDEX: 32.24 KG/M2 | SYSTOLIC BLOOD PRESSURE: 114 MMHG | DIASTOLIC BLOOD PRESSURE: 65 MMHG | WEIGHT: 139.31 LBS | TEMPERATURE: 98 F | HEIGHT: 55 IN | OXYGEN SATURATION: 95 % | RESPIRATION RATE: 20 BRPM | HEART RATE: 67 BPM

## 2018-10-22 DIAGNOSIS — M79.604 RIGHT LEG PAIN: ICD-10-CM

## 2018-10-22 PROCEDURE — 99283 EMERGENCY DEPT VISIT LOW MDM: CPT

## 2018-10-23 NOTE — ED PROVIDER NOTES
"SCRIBE #1 NOTE: I, Zoila Conrad, am scribing for, and in the presence of, KATHERYN Govea. I have scribed the entire note.      History      Chief Complaint   Patient presents with    Leg Pain     pt c/o R hip/leg/knee pain; pt states she has gricelda in leg and is concerned that pin may have moved       Review of patient's allergies indicates:   Allergen Reactions    Demerol [meperidine]      "I'm just sensitive to it."        HPI   HPI    10/22/2018, 8:29 PM   History obtained from the patient      History of Present Illness: Penny Pereira is a 48 y.o. female patient with PMHx of osteogenesis imperfecta s/o bilateral hip/femur surgery in the past, who presents to the Emergency Department for evaluation due to upper R leg pain which onset gradually 5 days ago. Pt states she had femoral rods placed bilaterally when she was 19 yo and is concerned that the hardware may be loose or broken because it happened before and she had to have the right side revised. She denies any trauma. Sxs are constant and 5/10 in severity. No mitigating or exacerbating factors reported. Pt denies any recent trauma/injury, tingling, numbness/weakness in the lower extremities, leg swelling, fever, chills, and all other sxs at this time. No prior tx reported. No further concerns or complaints at this time.     Arrival mode: Personal vehicle    PCP: Rob Price MD       Past Medical History:  Past Medical History:   Diagnosis Date    ICH (intracerebral hemorrhage)     Migraine headache     Osteogenesis imperfecta        Past Surgical History:  Past Surgical History:   Procedure Laterality Date    BRAIN SURGERY       SECTION      x 3    HYSTERECTOMY      LEG SURGERY Bilateral          Family History:  None reported    Social History:  Social History     Tobacco Use    Smoking status: Never Smoker    Smokeless tobacco: Never Used   Substance and Sexual Activity    Alcohol use: No    Drug use: No    Sexual " activity: None reported       ROS   Review of Systems   Constitutional: Negative for activity change, appetite change, chills, diaphoresis, fatigue and fever.   HENT: Negative for congestion, ear pain, nosebleeds, rhinorrhea, sinus pain, sore throat and trouble swallowing.    Eyes: Negative for pain and discharge.   Respiratory: Negative for cough, chest tightness, shortness of breath, wheezing and stridor.    Cardiovascular: Negative for chest pain, palpitations and leg swelling.   Gastrointestinal: Negative for abdominal distention, abdominal pain, blood in stool, constipation, diarrhea, nausea and vomiting.   Genitourinary: Negative for difficulty urinating, dysuria, flank pain, frequency, hematuria and urgency.   Musculoskeletal: Negative for arthralgias, back pain, myalgias and neck pain.        (+) R leg, hip, and knee pain  (-) trauma/injury   Skin: Negative for pallor, rash and wound.   Neurological: Negative for dizziness, syncope, weakness, light-headedness, numbness and headaches.        (-) tingling   Hematological: Does not bruise/bleed easily.   Psychiatric/Behavioral: Negative for confusion and self-injury.   All other systems reviewed and are negative.    Physical Exam      Initial Vitals [10/22/18 1926]   BP Pulse Resp Temp SpO2   114/65 67 20 98.4 °F (36.9 °C) 95 %      MAP       --          Physical Exam  Nursing Notes and Vital Signs Reviewed.  Constitutional: Patient is in mild distress. Ambulating using a walker.   Head: Normocephalic.  ENT: Mucous membranes are moist.   Neck: Supple. Full ROM.   Cardiovascular: Regular rate. Regular rhythm. Distal pulses are 2+ and symmetric.  Pulmonary/Chest: No respiratory distress.   Abdominal: Soft and non-distended. There is no tenderness.    Musculoskeletal: Moves all extremities. No obvious deformities. No edema. No calf tenderness. No joint swelling, effusion, or laxity.   RLE: Ambulatory with walker. No evident deformity. Negative for swelling.  "Negative for tenderness. ROM is normal to R hip and knee. No calf pain. No induration, erythema, or ecchymosis. Cap refill distally is <2 seconds. DP and PT pulses are equal and 2+ bilaterally. No motor deficit. No distal sensory deficit.  Skin: Warm and dry.  Neurological:  Alert, awake, and appropriate. Normal speech. No acute focal neurological deficits are appreciated.  Psychiatric: Normal affect.     ED Course    Procedures  ED Vital Signs:  Vitals:    10/22/18 1926   BP: 114/65   Pulse: 67   Resp: 20   Temp: 98.4 °F (36.9 °C)   TempSrc: Oral   SpO2: 95%   Weight: 63.2 kg (139 lb 5.3 oz)   Height: 4' 6" (1.372 m)       Abnormal Lab Results:  Labs Reviewed - No data to display     All Lab Results:  None    Imaging Results:  Imaging Results          X-Ray Femur Ap/Lat Right (Final result)  Result time 10/22/18 20:51:54    Final result by Giuseppe Blanca MD (10/22/18 20:51:54)                 Impression:      No acute fracture or dislocation.      Electronically signed by: Giuseppe Blanca MD  Date:    10/22/2018  Time:    20:51             Narrative:    EXAMINATION:  XR FEMUR 2 VIEW RIGHT    CLINICAL HISTORY:  XR FEMUR 2 VIEW RIGHTPain in right leg    COMPARISON:  None    FINDINGS:  Three views of the right femur were obtained.    No evidence of acute fracture or dislocation.  Diffuse osteopenia and moderate degenerative changes of the hip and chronic deformity of the right femoral neck.                                        The Emergency Provider reviewed the vital signs and test results, which are outlined above.    ED Discussion     9:08 PM: Discussed with pt all pertinent ED information and results. Discussed pt dx of R leg pain and plan of tx. Gave pt all f/u and return to the ED instructions. All questions and concerns were addressed at this time. Pt expresses understanding of information and instructions, and is comfortable with plan to discharge. Pt is stable for discharge.    I discussed with patient " and/or family/caretaker that evaluation in the ED does not suggest any emergent or life threatening medical conditions requiring immediate intervention beyond what was provided in the ED, and I believe patient is safe for discharge.  Regardless, an unremarkable evaluation in the ED does not preclude the development or presence of a serious of life threatening condition. As such, patient was instructed to return immediately for any worsening or change in current symptoms.    ED Medication(s):  Medications - No data to display    Follow-up Information     Rob Price MD. Schedule an appointment as soon as possible for a visit in 2 days.    Specialty:  Family Medicine  Contact information:  [address]  [address]  [facility] [address]  [phone]             [facility] - .    Specialty:  Emergency Medicine  Why:  If symptoms worsen in any way  Contact information:  [address]  [place] [id number] [phone]               Current Discharge Medication List   None       Medical Decision Making    Medical Decision Making:   History:   Old Medical Records: I decided to obtain old medical records.  Clinical Tests:   Radiological Study: Ordered and Reviewed     Additional MDM:   X-Rays: I have independently interpreted X-Ray(s) - see notes.        Scribe Attestation:   Scribe #1: I performed the above scribed service and the documentation accurately describes the services I performed. I attest to the accuracy of the note.    Attending:   Physician Attestation Statement for Scribe #1: I, KATHERYN Govea, personally performed the services described in this documentation, as scribed by Zoila Conrad, in my presence, and it is both accurate and complete.          Clinical Impression       ICD-10-CM ICD-9-CM   1. Right leg pain M79.604 729.5       Disposition:   Disposition: Discharged  Condition: Stable         Andre Howe PA-C  10/22/18 211

## 2020-07-07 ENCOUNTER — HOSPITAL ENCOUNTER (EMERGENCY)
Facility: HOSPITAL | Age: 50
Discharge: HOME | End: 2020-07-07
Attending: EMERGENCY MEDICINE
Payer: MEDICAID

## 2020-07-07 VITALS
OXYGEN SATURATION: 95 % | DIASTOLIC BLOOD PRESSURE: 50 MMHG | SYSTOLIC BLOOD PRESSURE: 102 MMHG | TEMPERATURE: 99 F | BODY MASS INDEX: 31.23 KG/M2 | WEIGHT: 134.94 LBS | HEART RATE: 89 BPM | RESPIRATION RATE: 20 BRPM | HEIGHT: 55 IN

## 2020-07-07 DIAGNOSIS — U07.1 COVID-19 VIRUS DETECTED: Primary | ICD-10-CM

## 2020-07-07 DIAGNOSIS — R07.9 CHEST PAIN: ICD-10-CM

## 2020-07-07 DIAGNOSIS — J18.9 PNEUMONIA OF BOTH LOWER LOBES DUE TO INFECTIOUS ORGANISM: ICD-10-CM

## 2020-07-07 LAB
ALBUMIN SERPL BCP-MCNC: 3.7 G/DL (ref 3.5–5.2)
ALLENS TEST: ABNORMAL
ALP SERPL-CCNC: 110 U/L (ref 55–135)
ALT SERPL W/O P-5'-P-CCNC: 26 U/L (ref 10–44)
ANION GAP SERPL CALC-SCNC: 8 MMOL/L (ref 8–16)
AST SERPL-CCNC: 25 U/L (ref 10–40)
BASOPHILS # BLD AUTO: 0.01 K/UL (ref 0–0.2)
BASOPHILS NFR BLD: 0.2 % (ref 0–1.9)
BILIRUB SERPL-MCNC: 0.2 MG/DL (ref 0.1–1)
BNP SERPL-MCNC: <10 PG/ML (ref 0–99)
BUN SERPL-MCNC: 9 MG/DL (ref 6–20)
CALCIUM SERPL-MCNC: 8.9 MG/DL (ref 8.7–10.5)
CHLORIDE SERPL-SCNC: 106 MMOL/L (ref 95–110)
CO2 SERPL-SCNC: 28 MMOL/L (ref 23–29)
CREAT SERPL-MCNC: 0.6 MG/DL (ref 0.5–1.4)
DELSYS: ABNORMAL
DIFFERENTIAL METHOD: ABNORMAL
EOSINOPHIL # BLD AUTO: 0.1 K/UL (ref 0–0.5)
EOSINOPHIL NFR BLD: 1.7 % (ref 0–8)
ERYTHROCYTE [DISTWIDTH] IN BLOOD BY AUTOMATED COUNT: 13.8 % (ref 11.5–14.5)
EST. GFR  (AFRICAN AMERICAN): >60 ML/MIN/1.73 M^2
EST. GFR  (NON AFRICAN AMERICAN): >60 ML/MIN/1.73 M^2
FIO2: 21
GLUCOSE SERPL-MCNC: 102 MG/DL (ref 70–110)
HCO3 UR-SCNC: 25.2 MMOL/L (ref 24–28)
HCT VFR BLD AUTO: 40.8 % (ref 37–48.5)
HGB BLD-MCNC: 13 G/DL (ref 12–16)
HIV 1+2 AB+HIV1 P24 AG SERPL QL IA: NEGATIVE
IMM GRANULOCYTES # BLD AUTO: 0.01 K/UL (ref 0–0.04)
IMM GRANULOCYTES NFR BLD AUTO: 0.2 % (ref 0–0.5)
LYMPHOCYTES # BLD AUTO: 0.8 K/UL (ref 1–4.8)
LYMPHOCYTES NFR BLD: 14.3 % (ref 18–48)
MCH RBC QN AUTO: 27.6 PG (ref 27–31)
MCHC RBC AUTO-ENTMCNC: 31.9 G/DL (ref 32–36)
MCV RBC AUTO: 87 FL (ref 82–98)
MODE: ABNORMAL
MONOCYTES # BLD AUTO: 0.5 K/UL (ref 0.3–1)
MONOCYTES NFR BLD: 8.9 % (ref 4–15)
NEUTROPHILS # BLD AUTO: 4 K/UL (ref 1.8–7.7)
NEUTROPHILS NFR BLD: 74.7 % (ref 38–73)
NRBC BLD-RTO: 0 /100 WBC
PCO2 BLDA: 43.3 MMHG (ref 35–45)
PH SMN: 7.37 [PH] (ref 7.35–7.45)
PLATELET # BLD AUTO: 161 K/UL (ref 150–350)
PMV BLD AUTO: 9.4 FL (ref 9.2–12.9)
PO2 BLDA: 59 MMHG (ref 80–100)
POC BE: 0 MMOL/L
POC SATURATED O2: 90 % (ref 95–100)
POTASSIUM SERPL-SCNC: 3.8 MMOL/L (ref 3.5–5.1)
PROT SERPL-MCNC: 7.4 G/DL (ref 6–8.4)
RBC # BLD AUTO: 4.71 M/UL (ref 4–5.4)
SAMPLE: ABNORMAL
SARS-COV-2 RDRP RESP QL NAA+PROBE: POSITIVE
SITE: ABNORMAL
SODIUM SERPL-SCNC: 142 MMOL/L (ref 136–145)
TROPONIN I SERPL DL<=0.01 NG/ML-MCNC: <0.006 NG/ML (ref 0–0.03)
WBC # BLD AUTO: 5.37 K/UL (ref 3.9–12.7)

## 2020-07-07 PROCEDURE — 25000003 PHARM REV CODE 250: Performed by: PHYSICIAN ASSISTANT

## 2020-07-07 PROCEDURE — 87040 BLOOD CULTURE FOR BACTERIA: CPT

## 2020-07-07 PROCEDURE — 93005 ELECTROCARDIOGRAM TRACING: CPT

## 2020-07-07 PROCEDURE — 63600175 PHARM REV CODE 636 W HCPCS: Performed by: EMERGENCY MEDICINE

## 2020-07-07 PROCEDURE — 82803 BLOOD GASES ANY COMBINATION: CPT

## 2020-07-07 PROCEDURE — 96367 TX/PROPH/DG ADDL SEQ IV INF: CPT

## 2020-07-07 PROCEDURE — 25500020 PHARM REV CODE 255: Performed by: EMERGENCY MEDICINE

## 2020-07-07 PROCEDURE — 85025 COMPLETE CBC W/AUTO DIFF WBC: CPT

## 2020-07-07 PROCEDURE — 25000003 PHARM REV CODE 250: Performed by: EMERGENCY MEDICINE

## 2020-07-07 PROCEDURE — 93010 EKG 12-LEAD: ICD-10-PCS | Mod: ,,, | Performed by: INTERNAL MEDICINE

## 2020-07-07 PROCEDURE — 93010 ELECTROCARDIOGRAM REPORT: CPT | Mod: ,,, | Performed by: INTERNAL MEDICINE

## 2020-07-07 PROCEDURE — 96375 TX/PRO/DX INJ NEW DRUG ADDON: CPT | Mod: 59

## 2020-07-07 PROCEDURE — 84484 ASSAY OF TROPONIN QUANT: CPT

## 2020-07-07 PROCEDURE — 27000221 HC OXYGEN, UP TO 24 HOURS

## 2020-07-07 PROCEDURE — 99900035 HC TECH TIME PER 15 MIN (STAT)

## 2020-07-07 PROCEDURE — 80053 COMPREHEN METABOLIC PANEL: CPT

## 2020-07-07 PROCEDURE — 36600 WITHDRAWAL OF ARTERIAL BLOOD: CPT

## 2020-07-07 PROCEDURE — 96365 THER/PROPH/DIAG IV INF INIT: CPT | Mod: 59

## 2020-07-07 PROCEDURE — U0002 COVID-19 LAB TEST NON-CDC: HCPCS

## 2020-07-07 PROCEDURE — 86703 HIV-1/HIV-2 1 RESULT ANTBDY: CPT

## 2020-07-07 PROCEDURE — 83880 ASSAY OF NATRIURETIC PEPTIDE: CPT

## 2020-07-07 PROCEDURE — 99285 EMERGENCY DEPT VISIT HI MDM: CPT | Mod: 25

## 2020-07-07 RX ORDER — ALBUTEROL SULFATE 0.83 MG/ML
SOLUTION RESPIRATORY (INHALATION)
COMMUNITY
End: 2020-07-07

## 2020-07-07 RX ORDER — ONDANSETRON 4 MG/1
4 TABLET, FILM COATED ORAL EVERY 6 HOURS
Qty: 30 TABLET | Refills: 0 | Status: SHIPPED | OUTPATIENT
Start: 2020-07-07

## 2020-07-07 RX ORDER — MONTELUKAST SODIUM 10 MG/1
TABLET ORAL
COMMUNITY

## 2020-07-07 RX ORDER — ASPIRIN 325 MG
325 TABLET ORAL
Status: COMPLETED | OUTPATIENT
Start: 2020-07-07 | End: 2020-07-07

## 2020-07-07 RX ORDER — AMITRIPTYLINE HYDROCHLORIDE 50 MG/1
TABLET, FILM COATED ORAL
COMMUNITY

## 2020-07-07 RX ORDER — MULTIVITAMIN
TABLET ORAL
COMMUNITY

## 2020-07-07 RX ORDER — POLYMYXIN B SULFATE AND TRIMETHOPRIM 1; 10000 MG/ML; [USP'U]/ML
SOLUTION OPHTHALMIC
Status: ON HOLD | COMMUNITY
End: 2020-07-12 | Stop reason: HOSPADM

## 2020-07-07 RX ORDER — HYDROCODONE BITARTRATE AND ACETAMINOPHEN 5; 325 MG/1; MG/1
1 TABLET ORAL EVERY 4 HOURS PRN
Qty: 20 TABLET | Refills: 0 | Status: SHIPPED | OUTPATIENT
Start: 2020-07-07

## 2020-07-07 RX ORDER — KETOROLAC TROMETHAMINE 30 MG/ML
30 INJECTION, SOLUTION INTRAMUSCULAR; INTRAVENOUS
Status: COMPLETED | OUTPATIENT
Start: 2020-07-07 | End: 2020-07-07

## 2020-07-07 RX ORDER — FLUCONAZOLE 150 MG/1
TABLET ORAL
Status: ON HOLD | COMMUNITY
End: 2020-07-12 | Stop reason: HOSPADM

## 2020-07-07 RX ORDER — ACETAMINOPHEN 325 MG/1
650 TABLET ORAL
Status: COMPLETED | OUTPATIENT
Start: 2020-07-07 | End: 2020-07-07

## 2020-07-07 RX ORDER — ALBUTEROL SULFATE 2.5 MG/.5ML
2.5 SOLUTION RESPIRATORY (INHALATION) EVERY 4 HOURS PRN
Qty: 1 EACH | Refills: 0 | Status: SHIPPED | OUTPATIENT
Start: 2020-07-07 | End: 2021-07-07

## 2020-07-07 RX ORDER — AZITHROMYCIN 250 MG/1
250 TABLET, FILM COATED ORAL DAILY
Qty: 6 TABLET | Refills: 0 | Status: SHIPPED | OUTPATIENT
Start: 2020-07-07

## 2020-07-07 RX ORDER — BUDESONIDE AND FORMOTEROL FUMARATE DIHYDRATE 80; 4.5 UG/1; UG/1
AEROSOL RESPIRATORY (INHALATION)
COMMUNITY

## 2020-07-07 RX ORDER — ALENDRONATE SODIUM 70 MG/1
TABLET ORAL
COMMUNITY

## 2020-07-07 RX ADMIN — CEFTRIAXONE 1 G: 1 INJECTION, SOLUTION INTRAVENOUS at 06:07

## 2020-07-07 RX ADMIN — KETOROLAC TROMETHAMINE 30 MG: 30 INJECTION, SOLUTION INTRAMUSCULAR at 04:07

## 2020-07-07 RX ADMIN — IOHEXOL 100 ML: 350 INJECTION, SOLUTION INTRAVENOUS at 05:07

## 2020-07-07 RX ADMIN — ASPIRIN 325 MG ORAL TABLET 325 MG: 325 PILL ORAL at 03:07

## 2020-07-07 RX ADMIN — ACETAMINOPHEN 650 MG: 325 TABLET ORAL at 04:07

## 2020-07-07 RX ADMIN — AZITHROMYCIN MONOHYDRATE 500 MG: 500 INJECTION, POWDER, LYOPHILIZED, FOR SOLUTION INTRAVENOUS at 06:07

## 2020-07-07 NOTE — ED NOTES
Pt ambulated independently to bathroom, gait steady. Upon return, O2 sat on RA was 95%. Will continue to monitor.

## 2020-07-07 NOTE — ED PROVIDER NOTES
"SCRIBE #1 NOTE: I, Erik Valle, am scribing for, and in the presence of, Dean Horan Do, MD. I have scribed the entire note.      History      Chief Complaint   Patient presents with    Chest Pain     midsternal pain x 3 days. Non radiating pain, reports it hurts worse when using her asthma inhaler        Review of patient's allergies indicates:   Allergen Reactions    Demerol [meperidine]      "I'm just sensitive to it."        HPI   HPI    2020, 4:14 PM   History obtained from the patient      History of Present Illness: Penny Pereira is a 50 y.o. female patient who presents to the Emergency Department for mid-sternal CP, onset 3 days PTA. Pt reports her family has also been sick. She states she tested negative for a covid test at [facility] today. She describes the pain as a "pulled muscle." Symptoms are constant and moderate in severity. She states the pain is worsened with inhalation through her mouth. Associated sxs include dry cough. Patient denies any SOB, loss of taste or smell, diarrhea, nausea, vomiting, dizziness, HA, leg swelling, and all other sxs at this time. Prior Tx includes asthma inhaler with no relief of sx. No further complaints or concerns at this time.         Arrival mode: Personal vehicle     PCP: Rob Price MD       Past Medical History:  Past Medical History:   Diagnosis Date    ICH (intracerebral hemorrhage)     Migraine headache     Osteogenesis imperfecta        Past Surgical History:  Past Surgical History:   Procedure Laterality Date    BRAIN SURGERY       SECTION      x 3    HYSTERECTOMY      LEG SURGERY Bilateral          Family History:  History reviewed. No pertinent family history.     Social History:  Social History     Tobacco Use    Smoking status: Never Smoker    Smokeless tobacco: Never Used   Substance and Sexual Activity    Alcohol use: No    Drug use: No    Sexual activity: Yes     Partners: Male       ROS   Review of Systems "   Constitutional: Negative for appetite change, chills, diaphoresis, fatigue and fever.        (-) loss of taste or smell   HENT: Negative for congestion, rhinorrhea, sinus pain and sore throat.    Respiratory: Positive for cough. Negative for shortness of breath.    Cardiovascular: Positive for chest pain.   Gastrointestinal: Negative for abdominal pain, constipation, diarrhea, nausea and vomiting.   Genitourinary: Negative for dysuria.   Musculoskeletal: Negative for back pain.   Skin: Negative for rash.   Neurological: Negative for dizziness, weakness, light-headedness, numbness and headaches.   Hematological: Does not bruise/bleed easily.   All other systems reviewed and are negative.      Physical Exam      Initial Vitals [07/07/20 1453]   BP Pulse Resp Temp SpO2   130/67 104 20 99.1 °F (37.3 °C) (!) 94 %      MAP       --          Physical Exam  Nursing Notes and Vital Signs Reviewed.  Constitutional: Patient is in mild distress. Well-developed and well-nourished.  Head: Atraumatic. Normocephalic.  Eyes: PERRL. EOM intact. Conjunctivae are not pale. No scleral icterus.  ENT: Mucous membranes are moist. Oropharynx is clear and symmetric.    Neck: Supple. Full ROM. No lymphadenopathy.  Cardiovascular: Regular rate. Regular rhythm. No murmurs, rubs, or gallops. Distal pulses are 2+ and symmetric.  Pulmonary/Chest: No respiratory distress. Clear to auscultation bilaterally. No wheezing or rales.  Abdominal: Soft and non-distended.  There is no tenderness.  No rebound, guarding, or rigidity. Good bowel sounds.  Genitourinary: No CVA tenderness  Musculoskeletal: Moves all extremities. No obvious deformities. No edema. No calf tenderness.  Skin: Warm and dry.  Neurological:  Alert, awake, and appropriate.  Normal speech.  No acute focal neurological deficits are appreciated.  Psychiatric: Normal affect. Good eye contact. Appropriate in content.    ED Course    Procedures  ED Vital Signs:  Vitals:    07/07/20 1453  "07/07/20 1538 07/07/20 1545 07/07/20 1552   BP: 130/67 137/60     Pulse: 104 102 103 97   Resp: 20 20 16   Temp: 99.1 °F (37.3 °C)      TempSrc: Oral      SpO2: (!) 94% (!) 91%  99%   Weight: 61.2 kg (134 lb 14.7 oz)      Height: 4' 6" (1.372 m)       07/07/20 1602 07/07/20 1631 07/07/20 1712 07/07/20 1715   BP: (!) 116/54 (!) 122/59     Pulse: 91 97 98 100   Resp: 20 20 16 13   Temp:       TempSrc:       SpO2: 98% (!) 91% (!) 92% 95%   Weight:       Height:        07/07/20 1729 07/07/20 1806 07/07/20 1831   BP:  (!) 107/52 104/64   Pulse: 85 88 94   Resp: 18 20 15   Temp:      TempSrc:      SpO2: 100% 100% 100%   Weight:      Height:          Abnormal Lab Results:  Labs Reviewed   CBC W/ AUTO DIFFERENTIAL - Abnormal; Notable for the following components:       Result Value    Mean Corpuscular Hemoglobin Conc 31.9 (*)     Lymph # 0.8 (*)     Gran% 74.7 (*)     Lymph% 14.3 (*)     All other components within normal limits   SARS-COV-2 RNA AMPLIFICATION, QUAL - Abnormal; Notable for the following components:    SARS-CoV-2 RNA, Amplification, Qual Positive (*)     All other components within normal limits   ISTAT PROCEDURE - Abnormal; Notable for the following components:    POC PO2 59 (*)     POC SATURATED O2 90 (*)     All other components within normal limits   CULTURE, BLOOD   CULTURE, BLOOD   HIV 1 / 2 ANTIBODY   COMPREHENSIVE METABOLIC PANEL   TROPONIN I   B-TYPE NATRIURETIC PEPTIDE        All Lab Results:  Results for orders placed or performed during the hospital encounter of 07/07/20   HIV 1/2 Ag/Ab (4th Gen)   Result Value Ref Range    HIV 1/2 Ag/Ab Negative Negative   CBC auto differential   Result Value Ref Range    WBC 5.37 3.90 - 12.70 K/uL    RBC 4.71 4.00 - 5.40 M/uL    Hemoglobin 13.0 12.0 - 16.0 g/dL    Hematocrit 40.8 37.0 - 48.5 %    Mean Corpuscular Volume 87 82 - 98 fL    Mean Corpuscular Hemoglobin 27.6 27.0 - 31.0 pg    Mean Corpuscular Hemoglobin Conc 31.9 (L) 32.0 - 36.0 g/dL    RDW 13.8 11.5 " - 14.5 %    Platelets 161 150 - 350 K/uL    MPV 9.4 9.2 - 12.9 fL    Immature Granulocytes 0.2 0.0 - 0.5 %    Gran # (ANC) 4.0 1.8 - 7.7 K/uL    Immature Grans (Abs) 0.01 0.00 - 0.04 K/uL    Lymph # 0.8 (L) 1.0 - 4.8 K/uL    Mono # 0.5 0.3 - 1.0 K/uL    Eos # 0.1 0.0 - 0.5 K/uL    Baso # 0.01 0.00 - 0.20 K/uL    nRBC 0 0 /100 WBC    Gran% 74.7 (H) 38.0 - 73.0 %    Lymph% 14.3 (L) 18.0 - 48.0 %    Mono% 8.9 4.0 - 15.0 %    Eosinophil% 1.7 0.0 - 8.0 %    Basophil% 0.2 0.0 - 1.9 %    Differential Method Automated    Comprehensive metabolic panel   Result Value Ref Range    Sodium 142 136 - 145 mmol/L    Potassium 3.8 3.5 - 5.1 mmol/L    Chloride 106 95 - 110 mmol/L    CO2 28 23 - 29 mmol/L    Glucose 102 70 - 110 mg/dL    BUN, Bld 9 6 - 20 mg/dL    Creatinine 0.6 0.5 - 1.4 mg/dL    Calcium 8.9 8.7 - 10.5 mg/dL    Total Protein 7.4 6.0 - 8.4 g/dL    Albumin 3.7 3.5 - 5.2 g/dL    Total Bilirubin 0.2 0.1 - 1.0 mg/dL    Alkaline Phosphatase 110 55 - 135 U/L    AST 25 10 - 40 U/L    ALT 26 10 - 44 U/L    Anion Gap 8 8 - 16 mmol/L    eGFR if African American >60 >60 mL/min/1.73 m^2    eGFR if non African American >60 >60 mL/min/1.73 m^2   Troponin I #1   Result Value Ref Range    Troponin I <0.006 0.000 - 0.026 ng/mL   B-Type natriuretic peptide (BNP)   Result Value Ref Range    BNP <10 0 - 99 pg/mL   COVID-19 Rapid Screening   Result Value Ref Range    SARS-CoV-2 RNA, Amplification, Qual Positive (A) Negative   ISTAT PROCEDURE   Result Value Ref Range    POC PH 7.372 7.35 - 7.45    POC PCO2 43.3 35 - 45 mmHg    POC PO2 59 (LL) 80 - 100 mmHg    POC HCO3 25.2 24 - 28 mmol/L    POC BE 0 -2 to 2 mmol/L    POC SATURATED O2 90 (L) 95 - 100 %    Sample ARTERIAL     Site LR     Allens Test Pass     DelSys Room Air     Mode SPONT     FiO2 21          Imaging Results:  Imaging Results          CTA Chest Non-Coronary - PE Study (Final result)  Result time 07/07/20 17:23:52    Final result by Sal Velazquez MD (07/07/20 17:23:52)                  Impression:      No evidence of pulmonary embolism. Patchy bilateral faint ground-glass opacifications, suspicious for viral pneumonitis including COVID19.    Suggestion of mild thickening of the GE junction.  Mild esophagitis not excluded.  Consider nonemergent further evaluation with esophagram.    All CT scans at this location are performed using dose modulation techniques as appropriate to a performed exam including the following: Automated exposure control; adjustment of the mA and/or kV  according to patient size.      Electronically signed by: Sal Velazquez  Date:    07/07/2020  Time:    17:23             Narrative:    EXAMINATION:  CTA CHEST NON CORONARY    CLINICAL HISTORY:  Chest pain, normal EKG;    TECHNIQUE:  Low dose axial images, sagittal and coronal reformations were obtained from the thoracic inlet to the lung bases CT PE protocol following the IV administration of 100 mL of Omnipaque 350.  MIP images also provided.    COMPARISON:  Chest radiograph same day    FINDINGS:  Base of Neck: No significant abnormality.    Thoracic soft tissues: Unremarkable.    Aorta: Left-sided aortic arch.  No aneurysm and no significant atherosclerosis    Heart: Normal size. No effusion.    Pulmonary vasculature: Pulmonary arteries distribute normally.  No evidence of pulmonary embolism.    Liyah/Mediastinum: No pathologic sandi enlargement.  Small reactive appearing mediastinal and hilar lymph nodes.    Airways: Patent.    Lungs/Pleura: Patchy bilateral faint ground-glass opacifications, suspicious for viral pneumonitis including COVID19.    Esophagus: Suggestion of mild thickening of the GE junction.  Esophagitis not excluded.    Upper Abdomen: No abnormality of the partially imaged upper abdomen.    Bones: No acute fracture. No suspicious lytic or sclerotic lesions.  Scoliotic curvature of the thoracic spine.  A few nonunited chronic right posterior rib fractures                               X-Ray Chest AP  Portable (Final result)  Result time 07/07/20 15:30:00    Final result by Giuseppe Blanca MD (07/07/20 15:30:00)                 Impression:      No acute process seen.      Electronically signed by: Giuseppe Blanca MD  Date:    07/07/2020  Time:    15:30             Narrative:    EXAMINATION:  XR CHEST AP PORTABLE    CLINICAL HISTORY:  Chest Pain;    FINDINGS:  Single view of the chest.    Cardiac silhouette is normal.  The lungs demonstrate no evidence of active disease.  No evidence of pleural effusion or pneumothorax.  Bones demonstrate severe scoliotic curvature.                               The EKG was ordered, reviewed, and independently interpreted by the ED provider.  Interpretation time: 14:57  Rate: 105 BPM  Rhythm: sinus tachycardia  Interpretation: Possible anterior infarct, age undetermined. No STEMI.           The Emergency Provider reviewed the vital signs and test results, which are outlined above.    ED Discussion     7:07 PM: Reassessed pt at this time.  Pt was able to walk to the restroom with no complications. She reports some SOB while walking, but she feels comfortable going home. Discussed with pt all pertinent ED information and results. Discussed pt dx and plan of tx. Gave pt all f/u and return to the ED instructions. All questions and concerns were addressed at this time. Pt expresses understanding of information and instructions, and is comfortable with plan to discharge. Pt is stable for discharge.    I discussed with patient and/or family/caretaker that evaluation in the ED does not suggest any emergent or life threatening medical conditions requiring immediate intervention beyond what was provided in the ED, and I believe patient is safe for discharge.  Regardless, an unremarkable evaluation in the ED does not preclude the development or presence of a serious of life threatening condition. As such, patient was instructed to return immediately for any worsening or change in current  symptoms.           ED Medication(s):  Medications   azithromycin 500 mg in dextrose 5 % 250 mL IVPB (ready to mix system) (500 mg Intravenous New Bag 7/7/20 1826)   aspirin tablet 325 mg (325 mg Oral Given 7/7/20 1544)   ketorolac injection 30 mg (30 mg Intravenous Given 7/7/20 1646)   acetaminophen tablet 650 mg (650 mg Oral Given 7/7/20 1646)   iohexoL (OMNIPAQUE 350) injection 100 mL (100 mLs Intravenous Given 7/7/20 1706)   cefTRIAXone (ROCEPHIN) 1 g/50 mL D5W IVPB (0 g Intravenous Stopped 7/7/20 1827)           New Prescriptions    No medications on file       Medical Decision Making    Medical Decision Making:   Clinical Tests:   Lab Tests: Ordered and Reviewed  Radiological Study: Ordered and Reviewed  Medical Tests: Ordered and Reviewed           Scribe Attestation:   Scribe #1: I performed the above scribed service and the documentation accurately describes the services I performed. I attest to the accuracy of the note.    Attending:   Physician Attestation Statement for Scribe #1: I, Dean Horan Do, MD, personally performed the services described in this documentation, as scribed by Erik Valle, in my presence, and it is both accurate and complete.          Clinical Impression       ICD-10-CM ICD-9-CM   1. COVID-19 virus detected  U07.1    2. Chest pain  R07.9 786.50   3. Pneumonia of both lower lobes due to infectious organism  J18.1 483.8       Disposition:   Disposition: Discharged  Condition: Stable         Dean Horan Do, MD  07/07/20 4050

## 2020-07-09 ENCOUNTER — NURSE TRIAGE (OUTPATIENT)
Dept: ADMINISTRATIVE | Facility: CLINIC | Age: 50
End: 2020-07-09

## 2020-07-09 ENCOUNTER — HOSPITAL ENCOUNTER (INPATIENT)
Facility: HOSPITAL | Age: 50
LOS: 3 days | Discharge: HOME | DRG: 177 | End: 2020-07-12
Attending: EMERGENCY MEDICINE | Admitting: INTERNAL MEDICINE
Payer: MEDICAID

## 2020-07-09 DIAGNOSIS — R06.02 SOB (SHORTNESS OF BREATH): ICD-10-CM

## 2020-07-09 DIAGNOSIS — R07.9 CHEST PAIN: ICD-10-CM

## 2020-07-09 DIAGNOSIS — R53.1 WEAKNESS: ICD-10-CM

## 2020-07-09 DIAGNOSIS — R09.02 HYPOXEMIA: ICD-10-CM

## 2020-07-09 DIAGNOSIS — U07.1 COVID-19 VIRUS INFECTION: Primary | ICD-10-CM

## 2020-07-09 DIAGNOSIS — R79.89 ELEVATED TROPONIN LEVEL: ICD-10-CM

## 2020-07-09 PROBLEM — J45.20 MILD INTERMITTENT ASTHMA WITHOUT COMPLICATION: Status: ACTIVE | Noted: 2020-07-09

## 2020-07-09 PROBLEM — J12.9 VIRAL PNEUMONIA, UNSPECIFIED: Status: ACTIVE | Noted: 2020-07-09

## 2020-07-09 PROBLEM — F32.A DEPRESSION: Status: ACTIVE | Noted: 2020-07-09

## 2020-07-09 PROBLEM — Q78.0 OSTEOGENESIS IMPERFECTA: Status: ACTIVE | Noted: 2020-07-09

## 2020-07-09 LAB
ALBUMIN SERPL BCP-MCNC: 3.5 G/DL (ref 3.5–5.2)
ALLENS TEST: ABNORMAL
ALP SERPL-CCNC: 91 U/L (ref 55–135)
ALT SERPL W/O P-5'-P-CCNC: 25 U/L (ref 10–44)
ANION GAP SERPL CALC-SCNC: 10 MMOL/L (ref 8–16)
AST SERPL-CCNC: 25 U/L (ref 10–40)
BASOPHILS # BLD AUTO: 0.02 K/UL (ref 0–0.2)
BASOPHILS NFR BLD: 0.3 % (ref 0–1.9)
BILIRUB SERPL-MCNC: 0.4 MG/DL (ref 0.1–1)
BNP SERPL-MCNC: <10 PG/ML (ref 0–99)
BUN SERPL-MCNC: 9 MG/DL (ref 6–20)
CALCIUM SERPL-MCNC: 8.7 MG/DL (ref 8.7–10.5)
CHLORIDE SERPL-SCNC: 102 MMOL/L (ref 95–110)
CK SERPL-CCNC: 31 U/L (ref 20–180)
CO2 SERPL-SCNC: 28 MMOL/L (ref 23–29)
CREAT SERPL-MCNC: 0.6 MG/DL (ref 0.5–1.4)
CRP SERPL-MCNC: 36.8 MG/L (ref 0–8.2)
D DIMER PPP IA.FEU-MCNC: 0.3 MG/L FEU
D DIMER PPP IA.FEU-MCNC: 0.34 MG/L FEU
DELSYS: ABNORMAL
DIFFERENTIAL METHOD: ABNORMAL
EOSINOPHIL # BLD AUTO: 0 K/UL (ref 0–0.5)
EOSINOPHIL NFR BLD: 0.2 % (ref 0–8)
ERYTHROCYTE [DISTWIDTH] IN BLOOD BY AUTOMATED COUNT: 13.9 % (ref 11.5–14.5)
ERYTHROCYTE [SEDIMENTATION RATE] IN BLOOD BY WESTERGREN METHOD: 26 MM/HR (ref 0–20)
EST. GFR  (AFRICAN AMERICAN): >60 ML/MIN/1.73 M^2
EST. GFR  (NON AFRICAN AMERICAN): >60 ML/MIN/1.73 M^2
FIO2: 21
GLUCOSE SERPL-MCNC: 134 MG/DL (ref 70–110)
HCO3 UR-SCNC: 28.3 MMOL/L (ref 24–28)
HCT VFR BLD AUTO: 39.4 % (ref 37–48.5)
HGB BLD-MCNC: 12.8 G/DL (ref 12–16)
IMM GRANULOCYTES # BLD AUTO: 0.01 K/UL (ref 0–0.04)
IMM GRANULOCYTES NFR BLD AUTO: 0.2 % (ref 0–0.5)
LACTATE SERPL-SCNC: 0.9 MMOL/L (ref 0.5–2.2)
LDH SERPL L TO P-CCNC: 258 U/L (ref 110–260)
LYMPHOCYTES # BLD AUTO: 1.2 K/UL (ref 1–4.8)
LYMPHOCYTES NFR BLD: 19.7 % (ref 18–48)
MCH RBC QN AUTO: 28 PG (ref 27–31)
MCHC RBC AUTO-ENTMCNC: 32.5 G/DL (ref 32–36)
MCV RBC AUTO: 86 FL (ref 82–98)
MODE: ABNORMAL
MONOCYTES # BLD AUTO: 0.3 K/UL (ref 0.3–1)
MONOCYTES NFR BLD: 5.5 % (ref 4–15)
NEUTROPHILS # BLD AUTO: 4.5 K/UL (ref 1.8–7.7)
NEUTROPHILS NFR BLD: 74.1 % (ref 38–73)
NRBC BLD-RTO: 0 /100 WBC
PCO2 BLDA: 44.9 MMHG (ref 35–45)
PH SMN: 7.41 [PH] (ref 7.35–7.45)
PLATELET # BLD AUTO: 154 K/UL (ref 150–350)
PMV BLD AUTO: 9.3 FL (ref 9.2–12.9)
PO2 BLDA: 63 MMHG (ref 80–100)
POC BE: 4 MMOL/L
POC SATURATED O2: 92 % (ref 95–100)
POTASSIUM SERPL-SCNC: 4 MMOL/L (ref 3.5–5.1)
PROCALCITONIN SERPL IA-MCNC: 0.04 NG/ML
PROT SERPL-MCNC: 7.1 G/DL (ref 6–8.4)
RBC # BLD AUTO: 4.57 M/UL (ref 4–5.4)
SAMPLE: ABNORMAL
SITE: ABNORMAL
SODIUM SERPL-SCNC: 140 MMOL/L (ref 136–145)
TROPONIN I SERPL DL<=0.01 NG/ML-MCNC: 0.02 NG/ML (ref 0–0.03)
TROPONIN I SERPL DL<=0.01 NG/ML-MCNC: 0.04 NG/ML (ref 0–0.03)
WBC # BLD AUTO: 6.13 K/UL (ref 3.9–12.7)

## 2020-07-09 PROCEDURE — 96372 THER/PROPH/DIAG INJ SC/IM: CPT | Mod: 59

## 2020-07-09 PROCEDURE — 82550 ASSAY OF CK (CPK): CPT

## 2020-07-09 PROCEDURE — 84145 PROCALCITONIN (PCT): CPT

## 2020-07-09 PROCEDURE — 21400001 HC TELEMETRY ROOM

## 2020-07-09 PROCEDURE — 84484 ASSAY OF TROPONIN QUANT: CPT

## 2020-07-09 PROCEDURE — 25000242 PHARM REV CODE 250 ALT 637 W/ HCPCS: Performed by: EMERGENCY MEDICINE

## 2020-07-09 PROCEDURE — 63600175 PHARM REV CODE 636 W HCPCS: Performed by: NURSE PRACTITIONER

## 2020-07-09 PROCEDURE — 25000003 PHARM REV CODE 250: Performed by: NURSE PRACTITIONER

## 2020-07-09 PROCEDURE — 99900035 HC TECH TIME PER 15 MIN (STAT)

## 2020-07-09 PROCEDURE — 93010 EKG 12-LEAD: ICD-10-PCS | Mod: ,,, | Performed by: INTERNAL MEDICINE

## 2020-07-09 PROCEDURE — 25000242 PHARM REV CODE 250 ALT 637 W/ HCPCS: Performed by: NURSE PRACTITIONER

## 2020-07-09 PROCEDURE — 80053 COMPREHEN METABOLIC PANEL: CPT

## 2020-07-09 PROCEDURE — 83605 ASSAY OF LACTIC ACID: CPT

## 2020-07-09 PROCEDURE — 85651 RBC SED RATE NONAUTOMATED: CPT

## 2020-07-09 PROCEDURE — 84484 ASSAY OF TROPONIN QUANT: CPT | Mod: 91

## 2020-07-09 PROCEDURE — 85379 FIBRIN DEGRADATION QUANT: CPT

## 2020-07-09 PROCEDURE — 96365 THER/PROPH/DIAG IV INF INIT: CPT

## 2020-07-09 PROCEDURE — 96375 TX/PRO/DX INJ NEW DRUG ADDON: CPT

## 2020-07-09 PROCEDURE — 94640 AIRWAY INHALATION TREATMENT: CPT

## 2020-07-09 PROCEDURE — 87040 BLOOD CULTURE FOR BACTERIA: CPT | Mod: 59

## 2020-07-09 PROCEDURE — 86140 C-REACTIVE PROTEIN: CPT

## 2020-07-09 PROCEDURE — 82728 ASSAY OF FERRITIN: CPT

## 2020-07-09 PROCEDURE — 36415 COLL VENOUS BLD VENIPUNCTURE: CPT

## 2020-07-09 PROCEDURE — 82652 VIT D 1 25-DIHYDROXY: CPT

## 2020-07-09 PROCEDURE — 83880 ASSAY OF NATRIURETIC PEPTIDE: CPT

## 2020-07-09 PROCEDURE — 99291 CRITICAL CARE FIRST HOUR: CPT | Mod: 25

## 2020-07-09 PROCEDURE — 82803 BLOOD GASES ANY COMBINATION: CPT

## 2020-07-09 PROCEDURE — 85025 COMPLETE CBC W/AUTO DIFF WBC: CPT

## 2020-07-09 PROCEDURE — 93010 ELECTROCARDIOGRAM REPORT: CPT | Mod: ,,, | Performed by: INTERNAL MEDICINE

## 2020-07-09 PROCEDURE — 83615 LACTATE (LD) (LDH) ENZYME: CPT

## 2020-07-09 PROCEDURE — 27000221 HC OXYGEN, UP TO 24 HOURS

## 2020-07-09 PROCEDURE — 93005 ELECTROCARDIOGRAM TRACING: CPT

## 2020-07-09 PROCEDURE — 85379 FIBRIN DEGRADATION QUANT: CPT | Mod: 91

## 2020-07-09 PROCEDURE — 94761 N-INVAS EAR/PLS OXIMETRY MLT: CPT

## 2020-07-09 PROCEDURE — 36600 WITHDRAWAL OF ARTERIAL BLOOD: CPT

## 2020-07-09 PROCEDURE — A4216 STERILE WATER/SALINE, 10 ML: HCPCS | Performed by: NURSE PRACTITIONER

## 2020-07-09 RX ORDER — MONTELUKAST SODIUM 10 MG/1
10 TABLET ORAL DAILY
Status: DISCONTINUED | OUTPATIENT
Start: 2020-07-10 | End: 2020-07-12 | Stop reason: HOSPADM

## 2020-07-09 RX ORDER — DEXAMETHASONE SODIUM PHOSPHATE 4 MG/ML
6 INJECTION, SOLUTION INTRA-ARTICULAR; INTRALESIONAL; INTRAMUSCULAR; INTRAVENOUS; SOFT TISSUE
Status: COMPLETED | OUTPATIENT
Start: 2020-07-09 | End: 2020-07-09

## 2020-07-09 RX ORDER — AMITRIPTYLINE HYDROCHLORIDE 50 MG/1
50 TABLET, FILM COATED ORAL DAILY
Status: DISCONTINUED | OUTPATIENT
Start: 2020-07-10 | End: 2020-07-12 | Stop reason: HOSPADM

## 2020-07-09 RX ORDER — BUDESONIDE AND FORMOTEROL FUMARATE DIHYDRATE 80; 4.5 UG/1; UG/1
2 AEROSOL RESPIRATORY (INHALATION) 2 TIMES DAILY
Status: DISCONTINUED | OUTPATIENT
Start: 2020-07-09 | End: 2020-07-09 | Stop reason: CLARIF

## 2020-07-09 RX ORDER — ASCORBIC ACID 500 MG
500 TABLET ORAL 2 TIMES DAILY
Status: DISCONTINUED | OUTPATIENT
Start: 2020-07-09 | End: 2020-07-12 | Stop reason: HOSPADM

## 2020-07-09 RX ORDER — FLUTICASONE FUROATE AND VILANTEROL 100; 25 UG/1; UG/1
1 POWDER RESPIRATORY (INHALATION) DAILY
Status: DISCONTINUED | OUTPATIENT
Start: 2020-07-10 | End: 2020-07-12 | Stop reason: HOSPADM

## 2020-07-09 RX ORDER — HYDROCODONE BITARTRATE AND ACETAMINOPHEN 5; 325 MG/1; MG/1
1 TABLET ORAL EVERY 4 HOURS PRN
Status: DISCONTINUED | OUTPATIENT
Start: 2020-07-09 | End: 2020-07-12 | Stop reason: HOSPADM

## 2020-07-09 RX ORDER — CHOLECALCIFEROL (VITAMIN D3) 25 MCG
1000 TABLET ORAL DAILY
Status: DISCONTINUED | OUTPATIENT
Start: 2020-07-10 | End: 2020-07-12 | Stop reason: HOSPADM

## 2020-07-09 RX ORDER — ONDANSETRON 4 MG/1
4 TABLET, ORALLY DISINTEGRATING ORAL EVERY 6 HOURS PRN
Status: DISCONTINUED | OUTPATIENT
Start: 2020-07-09 | End: 2020-07-12 | Stop reason: HOSPADM

## 2020-07-09 RX ORDER — ENOXAPARIN SODIUM 100 MG/ML
40 INJECTION SUBCUTANEOUS EVERY 24 HOURS
Status: DISCONTINUED | OUTPATIENT
Start: 2020-07-09 | End: 2020-07-12 | Stop reason: HOSPADM

## 2020-07-09 RX ORDER — ZINC SULFATE 50(220)MG
220 CAPSULE ORAL DAILY
Status: DISCONTINUED | OUTPATIENT
Start: 2020-07-10 | End: 2020-07-12 | Stop reason: HOSPADM

## 2020-07-09 RX ORDER — FERROUS SULFATE, DRIED 160(50) MG
1 TABLET, EXTENDED RELEASE ORAL 2 TIMES DAILY
Status: DISCONTINUED | OUTPATIENT
Start: 2020-07-09 | End: 2020-07-12 | Stop reason: HOSPADM

## 2020-07-09 RX ORDER — ACETAMINOPHEN 325 MG/1
650 TABLET ORAL EVERY 4 HOURS PRN
Status: DISCONTINUED | OUTPATIENT
Start: 2020-07-09 | End: 2020-07-12 | Stop reason: HOSPADM

## 2020-07-09 RX ORDER — ALBUTEROL SULFATE 90 UG/1
2 AEROSOL, METERED RESPIRATORY (INHALATION) EVERY 4 HOURS PRN
Status: DISCONTINUED | OUTPATIENT
Start: 2020-07-09 | End: 2020-07-12 | Stop reason: HOSPADM

## 2020-07-09 RX ORDER — IPRATROPIUM BROMIDE AND ALBUTEROL SULFATE 2.5; .5 MG/3ML; MG/3ML
3 SOLUTION RESPIRATORY (INHALATION)
Status: COMPLETED | OUTPATIENT
Start: 2020-07-09 | End: 2020-07-09

## 2020-07-09 RX ORDER — ONDANSETRON 4 MG/1
4 TABLET, FILM COATED ORAL EVERY 6 HOURS PRN
Status: DISCONTINUED | OUTPATIENT
Start: 2020-07-09 | End: 2020-07-09 | Stop reason: SDUPTHER

## 2020-07-09 RX ORDER — SODIUM CHLORIDE 0.9 % (FLUSH) 0.9 %
10 SYRINGE (ML) INJECTION EVERY 8 HOURS
Status: DISCONTINUED | OUTPATIENT
Start: 2020-07-09 | End: 2020-07-12 | Stop reason: HOSPADM

## 2020-07-09 RX ADMIN — Medication 10 ML: at 09:07

## 2020-07-09 RX ADMIN — OYSTER SHELL CALCIUM WITH VITAMIN D 1 TABLET: 500; 200 TABLET, FILM COATED ORAL at 09:07

## 2020-07-09 RX ADMIN — IPRATROPIUM BROMIDE AND ALBUTEROL SULFATE 3 ML: .5; 3 SOLUTION RESPIRATORY (INHALATION) at 01:07

## 2020-07-09 RX ADMIN — ENOXAPARIN SODIUM 40 MG: 100 INJECTION SUBCUTANEOUS at 05:07

## 2020-07-09 RX ADMIN — IPRATROPIUM BROMIDE AND ALBUTEROL SULFATE 3 ML: .5; 3 SOLUTION RESPIRATORY (INHALATION) at 02:07

## 2020-07-09 RX ADMIN — ALBUTEROL SULFATE 2 PUFF: 90 AEROSOL, METERED RESPIRATORY (INHALATION) at 09:07

## 2020-07-09 RX ADMIN — AZITHROMYCIN MONOHYDRATE 500 MG: 500 INJECTION, POWDER, LYOPHILIZED, FOR SOLUTION INTRAVENOUS at 06:07

## 2020-07-09 RX ADMIN — CEFTRIAXONE 1 G: 1 INJECTION, SOLUTION INTRAVENOUS at 05:07

## 2020-07-09 RX ADMIN — DEXAMETHASONE SODIUM PHOSPHATE 6 MG: 4 INJECTION, SOLUTION INTRAMUSCULAR; INTRAVENOUS at 12:07

## 2020-07-09 RX ADMIN — THERA TABS 1 TABLET: TAB at 09:07

## 2020-07-09 RX ADMIN — OXYCODONE HYDROCHLORIDE AND ACETAMINOPHEN 500 MG: 500 TABLET ORAL at 09:07

## 2020-07-09 NOTE — SUBJECTIVE & OBJECTIVE
"Past Medical History:   Diagnosis Date    Asthma     Depression     ICH (intracerebral hemorrhage)     Migraine headache     Osteogenesis imperfecta        Past Surgical History:   Procedure Laterality Date    BRAIN SURGERY       SECTION      x 3    HYSTERECTOMY      LEG SURGERY Bilateral        Review of patient's allergies indicates:   Allergen Reactions    Demerol [meperidine]      "I'm just sensitive to it."       No current facility-administered medications on file prior to encounter.      Current Outpatient Medications on File Prior to Encounter   Medication Sig    albuterol sulfate 2.5 mg/0.5 mL Nebu Take 2.5 mg by nebulization every 4 (four) hours as needed. Rescue, Dispense 1 box    alendronate (FOSAMAX) 70 MG tablet alendronate 70 mg tablet    amitriptyline (ELAVIL) 50 MG tablet amitriptyline 50 mg tablet    azithromycin (Z-ZULEIMA) 250 MG tablet Take 1 tablet (250 mg total) by mouth once daily. Take first 2 tablets together, then 1 every day until finished.    budesonide-formoterol 80-4.5 mcg (SYMBICORT) 80-4.5 mcg/actuation HFAA budesonide-formoterol HFA 80 mcg-4.5 mcg/actuation aerosol inhaler    calcium-vitamin D 600 mg(1,500mg) -400 unit Tab Calcium with Vitamin D 600 mg (1,500 mg)-400 unit tablet   Take 2 tablets every day by oral route.    HYDROcodone-acetaminophen (NORCO) 5-325 mg per tablet Take 1 tablet by mouth every 4 (four) hours as needed.    montelukast (SINGULAIR) 10 mg tablet montelukast 10 mg tablet    ondansetron (ZOFRAN) 4 MG tablet Take 1 tablet (4 mg total) by mouth every 6 (six) hours.    sumatriptan (IMITREX) 50 MG tablet Take 50 mg by mouth daily as needed for Migraine.    fluconazole (DIFLUCAN) 150 MG Tab fluconazole 150 mg tablet    naproxen (NAPROSYN) 375 MG tablet Take 1 tablet (375 mg total) by mouth 2 (two) times daily with meals.    polymyxin B sulf-trimethoprim (POLYTRIM) 10,000 unit- 1 mg/mL Drop polymyxin B sulfate 10,000 unit-trimethoprim 1 " mg/mL eye drops   INSTILL 1 DROP TO AFFECTED EYE EVERY 6 HOURS     Family History     Problem Relation (Age of Onset)    No Known Problems Father, Sister, Sister    Ovarian cancer Mother        Tobacco Use    Smoking status: Never Smoker    Smokeless tobacco: Never Used   Substance and Sexual Activity    Alcohol use: No    Drug use: No    Sexual activity: Yes     Partners: Male     Review of Systems   Constitutional: Positive for appetite change, chills and fever (T max 100.0). Negative for activity change, diaphoresis and fatigue.   HENT: Negative for congestion, trouble swallowing and voice change.    Eyes: Negative for photophobia and discharge.   Respiratory: Positive for cough (non-productive) and shortness of breath. Negative for chest tightness and wheezing.    Cardiovascular: Negative for chest pain, palpitations and leg swelling.   Gastrointestinal: Positive for nausea. Negative for abdominal pain, blood in stool, constipation, diarrhea and vomiting.   Endocrine: Negative for cold intolerance, heat intolerance, polydipsia, polyphagia and polyuria.   Genitourinary: Negative for difficulty urinating, dysuria, flank pain, frequency and urgency.   Musculoskeletal: Positive for back pain (chronic). Negative for joint swelling and myalgias.   Skin: Negative for rash and wound.   Neurological: Positive for headaches. Negative for dizziness, seizures, syncope, facial asymmetry, weakness, light-headedness and numbness.   Psychiatric/Behavioral: Negative for confusion and hallucinations.     Objective:     Vital Signs (Most Recent):  Temp: 98.7 °F (37.1 °C) (07/09/20 1210)  Pulse: 80 (07/09/20 1532)  Resp: (!) 21 (07/09/20 1532)  BP: (!) 103/56 (07/09/20 1532)  SpO2: 98 % (07/09/20 1532) Vital Signs (24h Range):  Temp:  [98.7 °F (37.1 °C)] 98.7 °F (37.1 °C)  Pulse:  [72-86] 80  Resp:  [20-24] 21  SpO2:  [89 %-100 %] 98 %  BP: (103-122)/(56-70) 103/56        Body mass index is 32.53 kg/m².    Physical  Exam  Vitals signs reviewed.   Constitutional:       General: She is not in acute distress.     Appearance: Normal appearance. She is obese. She is not ill-appearing or toxic-appearing.   HENT:      Head: Normocephalic and atraumatic.      Nose: Nose normal. No congestion.      Mouth/Throat:      Mouth: Mucous membranes are moist.   Eyes:      Pupils: Pupils are equal, round, and reactive to light.   Neck:      Musculoskeletal: Normal range of motion and neck supple.   Cardiovascular:      Rate and Rhythm: Normal rate and regular rhythm.      Pulses: Normal pulses.      Heart sounds: Normal heart sounds.   Pulmonary:      Effort: Pulmonary effort is normal. No respiratory distress.      Breath sounds: Normal breath sounds. No wheezing or rhonchi.   Abdominal:      General: Abdomen is flat. Bowel sounds are normal. There is no distension.      Palpations: Abdomen is soft.      Tenderness: There is no abdominal tenderness. There is no rebound.   Musculoskeletal: Normal range of motion.         General: Deformity present. No swelling.      Right lower leg: No edema (Hx osteogenesis imperfecta).      Left lower leg: No edema.   Skin:     General: Skin is warm and dry.      Capillary Refill: Capillary refill takes less than 2 seconds.      Coloration: Skin is not jaundiced.      Findings: No rash.   Neurological:      General: No focal deficit present.      Mental Status: She is alert and oriented to person, place, and time.      Sensory: No sensory deficit.      Gait: Gait abnormal (chronic, uses walker).   Psychiatric:         Mood and Affect: Mood normal.         Behavior: Behavior normal.         Thought Content: Thought content normal.         Judgment: Judgment normal.            Significant Labs:   CBC:   Recent Labs   Lab 07/09/20  1255   WBC 6.13   HGB 12.8   HCT 39.4        CMP:   Recent Labs   Lab 07/09/20  1255      K 4.0      CO2 28   *   BUN 9   CREATININE 0.6   CALCIUM 8.7   PROT  7.1   ALBUMIN 3.5   BILITOT 0.4   ALKPHOS 91   AST 25   ALT 25   ANIONGAP 10   EGFRNONAA >60     Cardiac Markers: No results for input(s): CKMB, MYOGLOBIN, BNP, TROPISTAT in the last 48 hours.  Coagulation: No results for input(s): PT, INR, APTT in the last 48 hours.  Lactic Acid:   Recent Labs   Lab 07/09/20  1255   LACTATE 0.9     Magnesium: No results for input(s): MG in the last 48 hours.  POCT Glucose: No results for input(s): POCTGLUCOSE in the last 48 hours.  Troponin:   Recent Labs   Lab 07/09/20  1255   TROPONINI 0.043*     TSH: No results for input(s): TSH in the last 4320 hours.    Significant Imaging:   Imaging Results          X-Ray Chest 1 View (Final result)  Result time 07/09/20 14:05:21    Final result by BRAULIO Manzano Sr., MD (07/09/20 14:05:21)                 Impression:      1. The lungs are clear.  2. There is a moderate amount of levoconvex curvature of the thoracic spine.  3. The size of the heart is prominent.  This is characteristic of magnification.  .      Electronically signed by: Yobani Manzano MD  Date:    07/09/2020  Time:    14:05             Narrative:    EXAMINATION:  XR CHEST 1 VIEW    CLINICAL HISTORY:  Shortness of breath    COMPARISON:  07/07/2020    FINDINGS:  The size of the heart is prominent.  The lungs are clear. There is no pneumothorax.  The costophrenic angles are sharp.  There is a moderate amount of levoconvex curvature of the thoracic spine.                              CTA chest 07/07/2020 -No evidence of pulmonary embolism. Patchy bilateral faint ground-glass opacifications, suspicious for viral pneumonitis including COVID19. Suggestion of mild thickening of the GE junction.  Mild esophagitis not excluded.  Consider nonemergent further evaluation with esophagram.    COVID-19 07/07/2020 - positive    Results for orders placed or performed during the hospital encounter of 07/09/20   EKG 12-lead    Collection Time: 07/09/20  1:04 PM    Narrative    Test Reason :  R53.1,    Vent. Rate : 082 BPM     Atrial Rate : 082 BPM     P-R Int : 132 ms          QRS Dur : 076 ms      QT Int : 386 ms       P-R-T Axes : 059 016 017 degrees     QTc Int : 450 ms    Normal sinus rhythm  Nonspecific ST and T wave abnormality  Abnormal ECG  When compared with ECG of 07-JUL-2020 14:57,  Nonspecific T wave abnormality no longer evident in Lateral leads    Referred By: [email or web address]   SELF           Confirmed By:

## 2020-07-09 NOTE — ED NOTES
Pt resting in bed, in no acute distress, respirations even and unlabored. Call light within reach, pt educated on call light, pt verbalizes understanding. Pt denies any needs. Will continue to monitor.

## 2020-07-09 NOTE — ASSESSMENT & PLAN NOTE
Continue decadron as ordered  Supplemental O2 prn  Continue home meds including symbicort MDI  Albuterol inhaler prn SOB/wheezing

## 2020-07-09 NOTE — TELEPHONE ENCOUNTER
Pt called in response to covid symptom tracker. Pt states SOB when at rest, cough and nausea. Unable to eat or drink regularly from nausea.History of asthma. Reviewed care advice with pt to go to ER. Family member will drive pt and leave now. Called [facility] with report. Routed message to PCP-pt states she has Dr. Arvin Conway as PCP.    Reason for Disposition   MODERATE difficulty breathing (e.g., speaks in phrases, SOB even at rest, pulse 100-120)    Additional Information   Negative: SEVERE difficulty breathing (e.g., struggling for each breath, speaks in single words)   Negative: Difficult to awaken or acting confused (e.g., disoriented, slurred speech)   Negative: Bluish (or gray) lips or face now   Negative: Sounds like a life-threatening emergency to the triager   Negative: Shock suspected (e.g., cold/pale/clammy skin, too weak to stand, low BP, rapid pulse)   Negative: [1] COVID-19 exposure AND [2] NO symptoms   Negative: COVID-19 and Breastfeeding, questions about   Negative: [1] Adult with possible COVID-19 symptoms AND [2] triager concerned about severity of symptoms or other causes   Negative: SEVERE or constant chest pain or pressure (Exception: mild central chest pain, present only when coughing)    Protocols used: CORONAVIRUS (COVID-19) DIAGNOSED OR [id number]

## 2020-07-09 NOTE — ASSESSMENT & PLAN NOTE
Remdesivir fact sheet given, discussed with pt - awaiting patient decision  Continue decadron 6mg/day  LDH, CRP, procalcitonin, ferritin, Ddimer q48 hours  Vitamin D level pending  Supplement Zinc, Vitamin D, Vitamin C, MVI  VTE prophylaxis - lovenox, SCDs  - Infection Control notified     - Isolation:   - Airborne, Contact and Droplet Precautions  - Cohort patients into COVID units  - N95 mask, wear eye protection  - 20 second hand hygiene              - Limit visitors per hospital policy              - Consolidating lab draws, nursing care, provider visits, and interventions

## 2020-07-09 NOTE — ED PROVIDER NOTES
"50 year old female with complaint of shortness of breath, headache and nausea.  Diagnosed with COVID 2 days ago.  O2 sat 89% on RA.  Pt on Zithromax.        Talha Dye NP        SCRIBE #1 NOTE: I, Wale Oviedo, am scribing for, and in the presence of, Santiago Waller Jr., MD. I have scribed the entire note.       History     Chief Complaint   Patient presents with    COVID-19 Concerns     Pt states, "I was diagnosed with covid on Tuesday and I have a really bad headache, loss of appetite, nauseated, and a little Short of breath."     Review of patient's allergies indicates:   Allergen Reactions    Demerol [meperidine]      "I'm just sensitive to it."         History of Present Illness     HPI    2020, 1:08 PM  History obtained from the patient      History of Present Illness: Penny Pereira is a 50 y.o. female patient with a history of asthma who presents to the Emergency Department for evaluation of worsening SOB which onset several days ago. Symptoms are constant and moderate in severity. No mitigating or exacerbating factors reported. Associated sxs include cough, headache, decreased appetite and nausea. Patient denies any fever, chills, fatigue, v/d, chest pain, leg swelling, and all other sxs at this time. Patient evaluated for same issue 2 days ago at this facility and was started on Zithromax and Zofran. No further complaints or concerns at this time. Patient tested positive for covid-19 on . O2 89% on RA upon arrival.      Arrival mode: Personal transportation    PCP: Rob Price MD      Past Medical History:  Past Medical History:   Diagnosis Date    ICH (intracerebral hemorrhage)     Migraine headache     Osteogenesis imperfecta        Past Surgical History:  Past Surgical History:   Procedure Laterality Date    BRAIN SURGERY       SECTION      x 3    HYSTERECTOMY      LEG SURGERY Bilateral          Family History:  History reviewed. No pertinent family history. "       Social History:   Social History     Tobacco Use    Smoking status: Never Smoker    Smokeless tobacco: Never Used   Substance and Sexual Activity    Alcohol use: No    Drug use: No    Sexual activity: Yes     Partners: Male        Review of Systems     Review of Systems   Constitutional: Positive for appetite change. Negative for chills, fatigue and fever.   HENT: Negative for sore throat.    Respiratory: Positive for cough and shortness of breath.    Cardiovascular: Negative for chest pain and leg swelling.   Gastrointestinal: Positive for nausea. Negative for diarrhea and vomiting.   Genitourinary: Negative for dysuria.   Musculoskeletal: Negative for back pain.   Skin: Negative for rash.   Neurological: Positive for headaches. Negative for weakness.   Hematological: Does not bruise/bleed easily.   All other systems reviewed and are negative.       Physical Exam     Initial Vitals [07/09/20 1210]   BP Pulse Resp Temp SpO2   (!) 116/56 86 20 98.7 °F (37.1 °C) (!) 89 %      MAP       --          Physical Exam  Nursing Notes and Vital Signs Reviewed.  Constitutional: Well-developed and well-nourished. Patient is in NAD.  Head: Atraumatic. Normocephalic.  Eyes: . EOM intact.  No scleral icterus.  ENT: Mucous membranes are moist.  Nasal congestion present  Neck:. Full ROM.  No stridor.  Trachea midline.  Cardiovascular: Regular rate. Regular rhythm. No murmurs, rubs, or gallops. Distal pulses are 2+ and symmetric.  Pulmonary/Chest:  Tachypneic and hypoxemic.  These as faint wheezing in all lung fields.  Abdominal: Soft and non-distended.  There is no tenderness.  No rebound, guarding, or rigidity. Good bowel sounds.  Genitourinary: No CVA tenderness.  No suprapubic tenderness  Musculoskeletal: Moves all extremities. No obvious deformities. No calf tenderness.  Skin: Warm and dry.  Neurological:  Alert, awake, and appropriate.  Normal speech.  No acute focal neurological deficits are  "appreciated.  Psychiatric: Normal affect. Good eye contact. Appropriate in content.     ED Course   Critical Care    Date/Time: 7/9/2020 1:15 PM  Performed by: Santiago Waller Jr., MD  Authorized by: Santiago Waller Jr., MD   Direct patient critical care time: 20 minutes  Additional history critical care time: 5 minutes  Ordering / reviewing critical care time: 5 minutes  Documentation critical care time: 5 minutes  Total critical care time (exclusive of procedural time) : 35 minutes  Critical care time was exclusive of separately billable procedures and treating other patients and teaching time.  Critical care was necessary to treat or prevent imminent or life-threatening deterioration of the following conditions: Hypoxemia   Critical care was time spent personally by me on the following activities: blood draw for specimens, development of treatment plan with patient or surrogate, interpretation of cardiac output measurements, evaluation of patient's response to treatment, examination of patient, obtaining history from patient or surrogate, ordering and performing treatments and interventions, ordering and review of laboratory studies, ordering and review of radiographic studies, pulse oximetry, re-evaluation of patient's condition and review of old charts.        ED Vital Signs:  Vitals:    07/09/20 1210 07/09/20 1312 07/09/20 1350 07/09/20 1351   BP: (!) 116/56   122/70   Pulse: 86  73    Resp: 20  20    Temp: 98.7 °F (37.1 °C)      TempSrc: Oral      SpO2: (!) 89% 98% 100%    Height: 4' 6" (1.372 m)       07/09/20 1352 07/09/20 1355 07/09/20 1400 07/09/20 1432   BP:    (!) 111/57   Pulse: 72 73 84 80   Resp: (!) 21 20 20 (!) 24   Temp:       TempSrc:       SpO2: 100% 100% 100% 100%   Height:           Abnormal Lab Results:  Labs Reviewed   CBC W/ AUTO DIFFERENTIAL - Abnormal; Notable for the following components:       Result Value    Gran% 74.1 (*)     All other components within normal limits   COMPREHENSIVE " METABOLIC PANEL - Abnormal; Notable for the following components:    Glucose 134 (*)     All other components within normal limits   TROPONIN I - Abnormal; Notable for the following components:    Troponin I 0.043 (*)     All other components within normal limits   ISTAT PROCEDURE - Abnormal; Notable for the following components:    POC PO2 63 (*)     POC HCO3 28.3 (*)     POC SATURATED O2 92 (*)     All other components within normal limits   CULTURE, BLOOD   CULTURE, BLOOD   LACTIC ACID, PLASMA   CK   D DIMER, QUANTITATIVE   FERRITIN        All Lab Results:  Results for orders placed or performed during the hospital encounter of 07/09/20   CBC auto differential   Result Value Ref Range    WBC 6.13 3.90 - 12.70 K/uL    RBC 4.57 4.00 - 5.40 M/uL    Hemoglobin 12.8 12.0 - 16.0 g/dL    Hematocrit 39.4 37.0 - 48.5 %    Mean Corpuscular Volume 86 82 - 98 fL    Mean Corpuscular Hemoglobin 28.0 27.0 - 31.0 pg    Mean Corpuscular Hemoglobin Conc 32.5 32.0 - 36.0 g/dL    RDW 13.9 11.5 - 14.5 %    Platelets 154 150 - 350 K/uL    MPV 9.3 9.2 - 12.9 fL    Immature Granulocytes 0.2 0.0 - 0.5 %    Gran # (ANC) 4.5 1.8 - 7.7 K/uL    Immature Grans (Abs) 0.01 0.00 - 0.04 K/uL    Lymph # 1.2 1.0 - 4.8 K/uL    Mono # 0.3 0.3 - 1.0 K/uL    Eos # 0.0 0.0 - 0.5 K/uL    Baso # 0.02 0.00 - 0.20 K/uL    nRBC 0 0 /100 WBC    Gran% 74.1 (H) 38.0 - 73.0 %    Lymph% 19.7 18.0 - 48.0 %    Mono% 5.5 4.0 - 15.0 %    Eosinophil% 0.2 0.0 - 8.0 %    Basophil% 0.3 0.0 - 1.9 %    Differential Method Automated    Comprehensive metabolic panel   Result Value Ref Range    Sodium 140 136 - 145 mmol/L    Potassium 4.0 3.5 - 5.1 mmol/L    Chloride 102 95 - 110 mmol/L    CO2 28 23 - 29 mmol/L    Glucose 134 (H) 70 - 110 mg/dL    BUN, Bld 9 6 - 20 mg/dL    Creatinine 0.6 0.5 - 1.4 mg/dL    Calcium 8.7 8.7 - 10.5 mg/dL    Total Protein 7.1 6.0 - 8.4 g/dL    Albumin 3.5 3.5 - 5.2 g/dL    Total Bilirubin 0.4 0.1 - 1.0 mg/dL    Alkaline Phosphatase 91 55 - 135  U/L    AST 25 10 - 40 U/L    ALT 25 10 - 44 U/L    Anion Gap 10 8 - 16 mmol/L    eGFR if African American >60 >60 mL/min/1.73 m^2    eGFR if non African American >60 >60 mL/min/1.73 m^2   Lactic acid, plasma   Result Value Ref Range    Lactate (Lactic Acid) 0.9 0.5 - 2.2 mmol/L   CPK   Result Value Ref Range    CPK 31 20 - 180 U/L   Troponin I   Result Value Ref Range    Troponin I 0.043 (H) 0.000 - 0.026 ng/mL   ISTAT PROCEDURE   Result Value Ref Range    POC PH 7.408 7.35 - 7.45    POC PCO2 44.9 35 - 45 mmHg    POC PO2 63 (L) 80 - 100 mmHg    POC HCO3 28.3 (H) 24 - 28 mmol/L    POC BE 4 -2 to 2 mmol/L    POC SATURATED O2 92 (L) 95 - 100 %    Sample ARTERIAL     Site LR     Allens Test Pass     DelSys Room Air     Mode SPONT     FiO2 21          Imaging Results          X-Ray Chest 1 View (Final result)  Result time 07/09/20 14:05:21    Final result by BRAULIO Manzano Sr., MD (07/09/20 14:05:21)                 Impression:      1. The lungs are clear.  2. There is a moderate amount of levoconvex curvature of the thoracic spine.  3. The size of the heart is prominent.  This is characteristic of magnification.  .      Electronically signed by: Yobani Manzano MD  Date:    07/09/2020  Time:    14:05             Narrative:    EXAMINATION:  XR CHEST 1 VIEW    CLINICAL HISTORY:  Shortness of breath    COMPARISON:  07/07/2020    FINDINGS:  The size of the heart is prominent.  The lungs are clear. There is no pneumothorax.  The costophrenic angles are sharp.  There is a moderate amount of levoconvex curvature of the thoracic spine.                                 The EKG was ordered, reviewed, and independently interpreted by the ED provider.  Interpretation time: 1304  Rate: 82 BPM  Rhythm: NSR  Interpretation: nonspecific ST and T wave abnormality. No STEMI.           The Emergency Provider reviewed the vital signs and test results, which are outlined above.     ED Discussion       2:49 PM: Discussed case with Chiqui  Beatriz, NP (Hospital Medicine). Dr. Morrison agrees with current care and management of pt and accepts admission.   Admitting Service: Hospital Medicine  Admit to: obs / tele    Re-evaluated pt. I have discussed test results, shared treatment plan, and the need for admission with patient and family at bedside. Pt and family express understanding at this time and agree with all information. All questions answered. Pt and family have no further questions or concerns at this time. Pt is ready for admit.    2:52 PM  Patient has known coronavirus infection with decompensation now is hypoxemic on room air.  She is also tachypneic.  She requires oxygen in order to remain in the 90s.  She has a history of asthma as well.  Patient is being admitted to Hospital Medicine.     MDM     Contains abnormal data COVID-19 Rapid Screening  Order: [id number]  Status:  Final result   Visible to patient:  No (not released) Next appt:  None   Ref Range & Units 2d ago   SARS-CoV-2 RNA, Amplification, Qual Negative PositiveAbnormal     Comment: This test utilizes isothermal nucleic acid amplification   technology to detect the SARS-CoV-2 RdRp nucleic acid segment.   The analytical sensitivity (limit of detection) is 125 genome   equivalents/mL.   A POSITIVE result implies infection with the SARS-CoV-2 virus;   the patient is presumed to be contagious.     A NEGATIVE result means that SARS-CoV-2 nucleic acids are not   present above the limit of detection. A NEGATIVE result should be   treated as presumptive. It does not rule out the possibility of   COVID-19 and should not be the sole basis for treatment decisions.   If COVID-19 is strongly suspected based on clinical and exposure   history, re-testing using an alternate molecular assay should be   considered.   This test is only for use under the Food and Drug   Administration s Emergency Use Authorization (EUA).   Commercial kits are provided by [employer].   Performance  characteristics of the EUA have been independently   verified by [facility] Department of   Pathology and Laboratory Medicine.   _________________________________________________________________   The ID NOW COVID-19 Letter of Authorization, along with the   authorized Fact Sheet for Healthcare Providers, the authorized Fact   Sheet for Patients, and authorized labeling are available on the FDA   website:   www.fda.gov/MedicalDevices/Safety/EmergencySituations/nck805009.htm    Resulting Agency  [facility]         Specimen Collected: 07/07/20 17:56 Last Resulted: 07/07/20 18:38                Medical Decision Making:   Clinical Tests:   Lab Tests: Ordered and Reviewed  Radiological Study: Ordered and Reviewed  Medical Tests: Ordered and Reviewed           ED Medication(s):  Medications   dexamethasone injection 6 mg (6 mg Intravenous Given 7/9/20 1258)   albuterol-ipratropium 2.5 mg-0.5 mg/3 mL nebulizer solution 3 mL (3 mLs Nebulization Given 7/9/20 1400)       New Prescriptions    No medications on file               Scribe Attestation:   Scribe #1: I performed the above scribed service and the documentation accurately describes the services I performed. I attest to the accuracy of the note.     Attending:   Physician Attestation Statement for Scribe #1: I, Santiago Waller Jr., MD, personally performed the services described in this documentation, as scribed by Wale Oviedo, in my presence, and it is both accurate and complete.           Clinical Impression       ICD-10-CM ICD-9-CM   1. COVID-19 virus infection  U07.1    2. Weakness  R53.1 780.79   3. SOB (shortness of breath)  R06.02 786.05   4. Hypoxemia  R09.02 799.02       Disposition:   Disposition: Placed in Observation  Condition: Fair         Santiago Waller Jr., MD  07/09/20 5843

## 2020-07-09 NOTE — ASSESSMENT & PLAN NOTE
Supplemental O2 prn  Continue IV rocephin/azithromycin for now  Decadron 6mg daily  BC x2 pending  Albuterol inhaler prn SOB/wheezing  Tylenol prn fever  Continuous pulse oximetry

## 2020-07-09 NOTE — HPI
"51 y/o WF with hx of asthma, osteogenesis imperfecta, chronic pain to ED with c/o worsening SOB, NP cough, HA, nausea, poor appetite over the past 3 days, with associated "low grade" fever (Tmax 100.0) beginning this AM, in spite of increased usage of her home nebulizers/inhalers. Denies CP, edema, palpitations, wheezing, orthopnea, PND, abdominal pain, N/V/D, constipation, dysuria, dizziness, weakness, falls/trauma, focal deficits - pt is typically active and independent with ADLs and ambulates with a walker at home. Pt was seen in ED on 07/07/20 and diagnosed with COVID-19 with bilateral groundglass opacifications - sent home with po azithromycin but has continued to decline. Hypoxemic on RA today (89%), with tachypnea, and with slightly elevated troponin on labs (no CP) - O2 saturation improved to 100% on 2L NC and pt states she feels "much better" at this time. Hospital medicine contacted and pt placed in observation. Pt is full code. Surrogate decision maker is sister, Farhana Solomon.   "

## 2020-07-09 NOTE — H&P
"[facility] Medicine  History & Physical    Patient Name: Penny Pereira  MRN: [id number]  Admission Date: 2020  Attending Physician: Santiago Waller Jr., MD   Primary Care Provider: Rob Price MD         Patient information was obtained from patient, relative(s), past medical records and ER records.     Subjective:     Principal Problem:Viral pneumonia, unspecified    Chief Complaint:   Chief Complaint   Patient presents with    COVID-19 Concerns     Pt states, "I was diagnosed with covid on Tuesday and I have a really bad headache, loss of appetite, nauseated, and a little Short of breath."        HPI: 49 y/o WF with hx of asthma, osteogenesis imperfecta, chronic pain to ED with c/o worsening SOB, NP cough, HA, nausea, poor appetite over the past 3 days, with associated "low grade" fever (Tmax 100.0) beginning this AM, in spite of increased usage of her home nebulizers/inhalers. Denies CP, edema, palpitations, wheezing, orthopnea, PND, abdominal pain, N/V/D, constipation, dysuria, dizziness, weakness, falls/trauma, focal deficits - pt is typically active and independent with ADLs and ambulates with a walker at home. Pt was seen in ED on 20 and diagnosed with COVID-19 with bilateral groundglass opacifications - sent home with po azithromycin but has continued to decline. Hypoxemic on RA today (89%), with tachypnea, and with slightly elevated troponin on labs (no CP) - O2 saturation improved to 100% on 2L NC and pt states she feels "much better" at this time. Hospital medicine contacted and pt placed in observation. Pt is full code. Surrogate decision maker is sister, Farhana Solomon.     Past Medical History:   Diagnosis Date    Asthma     Depression     ICH (intracerebral hemorrhage)     Migraine headache     Osteogenesis imperfecta        Past Surgical History:   Procedure Laterality Date    BRAIN SURGERY       SECTION      x 3    HYSTERECTOMY      LEG " "SURGERY Bilateral        Review of patient's allergies indicates:   Allergen Reactions    Demerol [meperidine]      "I'm just sensitive to it."       No current facility-administered medications on file prior to encounter.      Current Outpatient Medications on File Prior to Encounter   Medication Sig    albuterol sulfate 2.5 mg/0.5 mL Nebu Take 2.5 mg by nebulization every 4 (four) hours as needed. Rescue, Dispense 1 box    alendronate (FOSAMAX) 70 MG tablet alendronate 70 mg tablet    amitriptyline (ELAVIL) 50 MG tablet amitriptyline 50 mg tablet    azithromycin (Z-ZULEIMA) 250 MG tablet Take 1 tablet (250 mg total) by mouth once daily. Take first 2 tablets together, then 1 every day until finished.    budesonide-formoterol 80-4.5 mcg (SYMBICORT) 80-4.5 mcg/actuation HFAA budesonide-formoterol HFA 80 mcg-4.5 mcg/actuation aerosol inhaler    calcium-vitamin D 600 mg(1,500mg) -400 unit Tab Calcium with Vitamin D 600 mg (1,500 mg)-400 unit tablet   Take 2 tablets every day by oral route.    HYDROcodone-acetaminophen (NORCO) 5-325 mg per tablet Take 1 tablet by mouth every 4 (four) hours as needed.    montelukast (SINGULAIR) 10 mg tablet montelukast 10 mg tablet    ondansetron (ZOFRAN) 4 MG tablet Take 1 tablet (4 mg total) by mouth every 6 (six) hours.    sumatriptan (IMITREX) 50 MG tablet Take 50 mg by mouth daily as needed for Migraine.    fluconazole (DIFLUCAN) 150 MG Tab fluconazole 150 mg tablet    naproxen (NAPROSYN) 375 MG tablet Take 1 tablet (375 mg total) by mouth 2 (two) times daily with meals.    polymyxin B sulf-trimethoprim (POLYTRIM) 10,000 unit- 1 mg/mL Drop polymyxin B sulfate 10,000 unit-trimethoprim 1 mg/mL eye drops   INSTILL 1 DROP TO AFFECTED EYE EVERY 6 HOURS     Family History     Problem Relation (Age of Onset)    No Known Problems Father, Sister, Sister    Ovarian cancer Mother        Tobacco Use    Smoking status: Never Smoker    Smokeless tobacco: Never Used   Substance and " Sexual Activity    Alcohol use: No    Drug use: No    Sexual activity: Yes     Partners: Male     Review of Systems   Constitutional: Positive for appetite change, chills and fever (T max 100.0). Negative for activity change, diaphoresis and fatigue.   HENT: Negative for congestion, trouble swallowing and voice change.    Eyes: Negative for photophobia and discharge.   Respiratory: Positive for cough (non-productive) and shortness of breath. Negative for chest tightness and wheezing.    Cardiovascular: Negative for chest pain, palpitations and leg swelling.   Gastrointestinal: Positive for nausea. Negative for abdominal pain, blood in stool, constipation, diarrhea and vomiting.   Endocrine: Negative for cold intolerance, heat intolerance, polydipsia, polyphagia and polyuria.   Genitourinary: Negative for difficulty urinating, dysuria, flank pain, frequency and urgency.   Musculoskeletal: Positive for back pain (chronic). Negative for joint swelling and myalgias.   Skin: Negative for rash and wound.   Neurological: Positive for headaches. Negative for dizziness, seizures, syncope, facial asymmetry, weakness, light-headedness and numbness.   Psychiatric/Behavioral: Negative for confusion and hallucinations.     Objective:     Vital Signs (Most Recent):  Temp: 98.7 °F (37.1 °C) (07/09/20 1210)  Pulse: 80 (07/09/20 1532)  Resp: (!) 21 (07/09/20 1532)  BP: (!) 103/56 (07/09/20 1532)  SpO2: 98 % (07/09/20 1532) Vital Signs (24h Range):  Temp:  [98.7 °F (37.1 °C)] 98.7 °F (37.1 °C)  Pulse:  [72-86] 80  Resp:  [20-24] 21  SpO2:  [89 %-100 %] 98 %  BP: (103-122)/(56-70) 103/56        Body mass index is 32.53 kg/m².    Physical Exam  Vitals signs reviewed.   Constitutional:       General: She is not in acute distress.     Appearance: Normal appearance. She is obese. She is not ill-appearing or toxic-appearing.   HENT:      Head: Normocephalic and atraumatic.      Nose: Nose normal. No congestion.      Mouth/Throat:       Mouth: Mucous membranes are moist.   Eyes:      Pupils: Pupils are equal, round, and reactive to light.   Neck:      Musculoskeletal: Normal range of motion and neck supple.   Cardiovascular:      Rate and Rhythm: Normal rate and regular rhythm.      Pulses: Normal pulses.      Heart sounds: Normal heart sounds.   Pulmonary:      Effort: Pulmonary effort is normal. No respiratory distress.      Breath sounds: Normal breath sounds. No wheezing or rhonchi.   Abdominal:      General: Abdomen is flat. Bowel sounds are normal. There is no distension.      Palpations: Abdomen is soft.      Tenderness: There is no abdominal tenderness. There is no rebound.   Musculoskeletal: Normal range of motion.         General: Deformity present. No swelling.      Right lower leg: No edema (Hx osteogenesis imperfecta).      Left lower leg: No edema.   Skin:     General: Skin is warm and dry.      Capillary Refill: Capillary refill takes less than 2 seconds.      Coloration: Skin is not jaundiced.      Findings: No rash.   Neurological:      General: No focal deficit present.      Mental Status: She is alert and oriented to person, place, and time.      Sensory: No sensory deficit.      Gait: Gait abnormal (chronic, uses walker).   Psychiatric:         Mood and Affect: Mood normal.         Behavior: Behavior normal.         Thought Content: Thought content normal.         Judgment: Judgment normal.            Significant Labs:   CBC:   Recent Labs   Lab 07/09/20  1255   WBC 6.13   HGB 12.8   HCT 39.4        CMP:   Recent Labs   Lab 07/09/20  1255      K 4.0      CO2 28   *   BUN 9   CREATININE 0.6   CALCIUM 8.7   PROT 7.1   ALBUMIN 3.5   BILITOT 0.4   ALKPHOS 91   AST 25   ALT 25   ANIONGAP 10   EGFRNONAA >60     Cardiac Markers: No results for input(s): CKMB, MYOGLOBIN, BNP, TROPISTAT in the last 48 hours.  Coagulation: No results for input(s): PT, INR, APTT in the last 48 hours.  Lactic Acid:   Recent Labs    Lab 07/09/20  1255   LACTATE 0.9     Magnesium: No results for input(s): MG in the last 48 hours.  POCT Glucose: No results for input(s): POCTGLUCOSE in the last 48 hours.  Troponin:   Recent Labs   Lab 07/09/20  1255   TROPONINI 0.043*     TSH: No results for input(s): TSH in the last 4320 hours.    Significant Imaging:   Imaging Results          X-Ray Chest 1 View (Final result)  Result time 07/09/20 14:05:21    Final result by BRAULIO Manzano Sr., MD (07/09/20 14:05:21)                 Impression:      1. The lungs are clear.  2. There is a moderate amount of levoconvex curvature of the thoracic spine.  3. The size of the heart is prominent.  This is characteristic of magnification.  .      Electronically signed by: Yobani Manzano MD  Date:    07/09/2020  Time:    14:05             Narrative:    EXAMINATION:  XR CHEST 1 VIEW    CLINICAL HISTORY:  Shortness of breath    COMPARISON:  07/07/2020    FINDINGS:  The size of the heart is prominent.  The lungs are clear. There is no pneumothorax.  The costophrenic angles are sharp.  There is a moderate amount of levoconvex curvature of the thoracic spine.                              CTA chest 07/07/2020 -No evidence of pulmonary embolism. Patchy bilateral faint ground-glass opacifications, suspicious for viral pneumonitis including COVID19. Suggestion of mild thickening of the GE junction.  Mild esophagitis not excluded.  Consider nonemergent further evaluation with esophagram.    COVID-19 07/07/2020 - positive    Results for orders placed or performed during the hospital encounter of 07/09/20   EKG 12-lead    Collection Time: 07/09/20  1:04 PM    Narrative    Test Reason : R53.1,    Vent. Rate : 082 BPM     Atrial Rate : 082 BPM     P-R Int : 132 ms          QRS Dur : 076 ms      QT Int : 386 ms       P-R-T Axes : 059 016 017 degrees     QTc Int : 450 ms    Normal sinus rhythm  Nonspecific ST and T wave abnormality  Abnormal ECG  When compared with ECG of  07-JUL-2020 14:57,  Nonspecific T wave abnormality no longer evident in Lateral leads    Referred By: JAMMIE   SELF           Confirmed By:            Assessment/Plan:     * Viral pneumonia r/t COVID-19 infection  Supplemental O2 prn  Continue IV rocephin/azithromycin for now  Decadron 6mg daily  BC x2 pending  Albuterol inhaler prn SOB/wheezing  Tylenol prn fever  Continuous pulse oximetry      COVID-19 virus infection  Remdesivir fact sheet given, discussed with pt - awaiting patient decision  Continue decadron 6mg/day  LDH, CRP, procalcitonin, ferritin, Ddimer q48 hours  Vitamin D level pending  Supplement Zinc, Vitamin D, Vitamin C, MVI  VTE prophylaxis - lovenox, SCDs  - Infection Control notified     - Isolation:   - Airborne, Contact and Droplet Precautions  - Cohort patients into COVID units  - N95 mask, wear eye protection  - 20 second hand hygiene              - Limit visitors per hospital policy              - Consolidating lab draws, nursing care, provider visits, and interventions      Elevated troponin  Likely r/t hypoxia  Trend troponin        Mild intermittent asthma without complication  Continue decadron as ordered  Supplemental O2 prn  Continue home meds including symbicort MDI  Albuterol inhaler prn SOB/wheezing        VTE Risk Mitigation (From admission, onward)         Ordered     enoxaparin injection 40 mg  Every 24 hours      07/09/20 1534     IP VTE HIGH RISK PATIENT  Once      07/09/20 1534     Place sequential compression device  Until discontinued      07/09/20 1534                   Joana Dai NP  Department of Hospital Medicine   [facility] -

## 2020-07-10 LAB
ANION GAP SERPL CALC-SCNC: 10 MMOL/L (ref 8–16)
BASOPHILS # BLD AUTO: 0.01 K/UL (ref 0–0.2)
BASOPHILS NFR BLD: 0.2 % (ref 0–1.9)
BUN SERPL-MCNC: 9 MG/DL (ref 6–20)
CALCIUM SERPL-MCNC: 9.4 MG/DL (ref 8.7–10.5)
CHLORIDE SERPL-SCNC: 103 MMOL/L (ref 95–110)
CO2 SERPL-SCNC: 28 MMOL/L (ref 23–29)
CREAT SERPL-MCNC: 0.6 MG/DL (ref 0.5–1.4)
DIFFERENTIAL METHOD: ABNORMAL
EOSINOPHIL # BLD AUTO: 0 K/UL (ref 0–0.5)
EOSINOPHIL NFR BLD: 0 % (ref 0–8)
ERYTHROCYTE [DISTWIDTH] IN BLOOD BY AUTOMATED COUNT: 13.7 % (ref 11.5–14.5)
EST. GFR  (AFRICAN AMERICAN): >60 ML/MIN/1.73 M^2
EST. GFR  (NON AFRICAN AMERICAN): >60 ML/MIN/1.73 M^2
FERRITIN SERPL-MCNC: 323 NG/ML (ref 20–300)
GLUCOSE SERPL-MCNC: 121 MG/DL (ref 70–110)
HCT VFR BLD AUTO: 40.3 % (ref 37–48.5)
HGB BLD-MCNC: 12.7 G/DL (ref 12–16)
IMM GRANULOCYTES # BLD AUTO: 0.02 K/UL (ref 0–0.04)
IMM GRANULOCYTES NFR BLD AUTO: 0.4 % (ref 0–0.5)
LYMPHOCYTES # BLD AUTO: 1.3 K/UL (ref 1–4.8)
LYMPHOCYTES NFR BLD: 27.3 % (ref 18–48)
MAGNESIUM SERPL-MCNC: 2 MG/DL (ref 1.6–2.6)
MCH RBC QN AUTO: 27.5 PG (ref 27–31)
MCHC RBC AUTO-ENTMCNC: 31.5 G/DL (ref 32–36)
MCV RBC AUTO: 87 FL (ref 82–98)
MONOCYTES # BLD AUTO: 0.5 K/UL (ref 0.3–1)
MONOCYTES NFR BLD: 9.3 % (ref 4–15)
NEUTROPHILS # BLD AUTO: 3 K/UL (ref 1.8–7.7)
NEUTROPHILS NFR BLD: 62.8 % (ref 38–73)
NRBC BLD-RTO: 0 /100 WBC
PLATELET # BLD AUTO: 162 K/UL (ref 150–350)
PMV BLD AUTO: 9.6 FL (ref 9.2–12.9)
POTASSIUM SERPL-SCNC: 4 MMOL/L (ref 3.5–5.1)
RBC # BLD AUTO: 4.62 M/UL (ref 4–5.4)
SODIUM SERPL-SCNC: 141 MMOL/L (ref 136–145)
TROPONIN I SERPL DL<=0.01 NG/ML-MCNC: 0.01 NG/ML (ref 0–0.03)
WBC # BLD AUTO: 4.84 K/UL (ref 3.9–12.7)

## 2020-07-10 PROCEDURE — 84484 ASSAY OF TROPONIN QUANT: CPT

## 2020-07-10 PROCEDURE — 63600175 PHARM REV CODE 636 W HCPCS: Performed by: NURSE PRACTITIONER

## 2020-07-10 PROCEDURE — 83735 ASSAY OF MAGNESIUM: CPT

## 2020-07-10 PROCEDURE — 25000003 PHARM REV CODE 250: Performed by: INTERNAL MEDICINE

## 2020-07-10 PROCEDURE — 85025 COMPLETE CBC W/AUTO DIFF WBC: CPT

## 2020-07-10 PROCEDURE — 94761 N-INVAS EAR/PLS OXIMETRY MLT: CPT

## 2020-07-10 PROCEDURE — 94640 AIRWAY INHALATION TREATMENT: CPT

## 2020-07-10 PROCEDURE — 27000221 HC OXYGEN, UP TO 24 HOURS

## 2020-07-10 PROCEDURE — 36415 COLL VENOUS BLD VENIPUNCTURE: CPT

## 2020-07-10 PROCEDURE — 21400001 HC TELEMETRY ROOM

## 2020-07-10 PROCEDURE — A4216 STERILE WATER/SALINE, 10 ML: HCPCS | Performed by: NURSE PRACTITIONER

## 2020-07-10 PROCEDURE — 80048 BASIC METABOLIC PNL TOTAL CA: CPT

## 2020-07-10 PROCEDURE — 25000003 PHARM REV CODE 250: Performed by: NURSE PRACTITIONER

## 2020-07-10 RX ADMIN — OYSTER SHELL CALCIUM WITH VITAMIN D 1 TABLET: 500; 200 TABLET, FILM COATED ORAL at 09:07

## 2020-07-10 RX ADMIN — VITAMIN D, TAB 1000IU (100/BT) 1000 UNITS: 25 TAB at 09:07

## 2020-07-10 RX ADMIN — Medication 10 ML: at 06:07

## 2020-07-10 RX ADMIN — THERA TABS 1 TABLET: TAB at 09:07

## 2020-07-10 RX ADMIN — AZITHROMYCIN MONOHYDRATE 500 MG: 500 INJECTION, POWDER, LYOPHILIZED, FOR SOLUTION INTRAVENOUS at 04:07

## 2020-07-10 RX ADMIN — CEFTRIAXONE 1 G: 1 INJECTION, SOLUTION INTRAVENOUS at 04:07

## 2020-07-10 RX ADMIN — DEXAMETHASONE 6 MG: 4 TABLET ORAL at 09:07

## 2020-07-10 RX ADMIN — Medication 220 MG: at 09:07

## 2020-07-10 RX ADMIN — MONTELUKAST 10 MG: 10 TABLET, FILM COATED ORAL at 09:07

## 2020-07-10 RX ADMIN — OXYCODONE HYDROCHLORIDE AND ACETAMINOPHEN 500 MG: 500 TABLET ORAL at 09:07

## 2020-07-10 RX ADMIN — AMITRIPTYLINE HYDROCHLORIDE 50 MG: 50 TABLET, FILM COATED ORAL at 09:07

## 2020-07-10 RX ADMIN — ALBUTEROL SULFATE 2 PUFF: 90 AEROSOL, METERED RESPIRATORY (INHALATION) at 10:07

## 2020-07-10 RX ADMIN — Medication 10 ML: at 09:07

## 2020-07-10 RX ADMIN — ENOXAPARIN SODIUM 40 MG: 100 INJECTION SUBCUTANEOUS at 04:07

## 2020-07-10 RX ADMIN — SODIUM CHLORIDE 200 MG: 9 INJECTION, SOLUTION INTRAVENOUS at 04:07

## 2020-07-10 RX ADMIN — Medication 10 ML: at 04:07

## 2020-07-10 NOTE — HOSPITAL COURSE
7/10- Afebrile . SpO2 96% on FiO2 28% . Pt feels slightly better . Remdesivir fact sheet obtained and pt agreed to start treatment .   7/11 - Afebrile on 2 L oxygen, normal respiratory rate, normal pulse. Remdesivir and Decadron started 7/10/2020  IV Rocephin and Azithromycin. Proposed discharge tomorrow  7/12/2020 - Pt remained afebrile. Stable vital signs and baseline labs. She did qualify for home oxygen - 2 L nasal cannula at rest as her pulse ox was 88 %. She was discharged to continue Azithromycin and albuterol treatments. COVID home monitoring program was ordered. Also, pulse oximeter for home use was ordered. Pt was seen and examined and determined to be safe and stable for discharge. She was advised to follow up with her PCP.

## 2020-07-10 NOTE — ASSESSMENT & PLAN NOTE
Remdesivir fact sheet given, discussed with pt - awaiting patient decision  Continue decadron 6mg/day  LDH, CRP, procalcitonin, ferritin, Ddimer q48 hours  Vitamin D level pending  Supplement Zinc, Vitamin D, Vitamin C, MVI  VTE prophylaxis - lovenox, SCDs  - Infection Control notified     - Isolation:   - Airborne, Contact and Droplet Precautions  - Cohort patients into COVID units  - N95 mask, wear eye protection  - 20 second hand hygiene              - Limit visitors per hospital policy              - Consolidating lab draws, nursing care, provider visits, and interventions  7/10- Remdesivir started , Day #1

## 2020-07-10 NOTE — PLAN OF CARE
Ongoing (interventions implemented as appropriate)  Pt AAO x4.  VSS  Pt able to make needs known.  Pt remained afebrile throughout this shift.   Pt ambulates in room with rolling walker  Pt remained free of falls this shift.   Pt denies pain this shift.  Plan of care reviewed. Patient verbalizes understanding.   Pt moving/turing independent in bed. Frequent weight shifting encouraged.  Patient sinus rhythm on monitor.   Bed low, side rails up x 2, wheels locked, call light in reach.   Hourly rounding completed.   Will continue to monitor.

## 2020-07-10 NOTE — SUBJECTIVE & OBJECTIVE
Interval History:   Afebrile . SpO2 96% on FiO2 28% . Pt feels slightly better . Remdesivir fact sheet obtained and pt agreed to start treatment      Review of Systems   Constitutional: Negative for activity change, appetite change and fever.   HENT: Negative for sore throat.    Eyes: Negative for visual disturbance.   Respiratory: Positive for cough and shortness of breath. Negative for chest tightness.    Cardiovascular: Negative for chest pain, palpitations and leg swelling.   Gastrointestinal: Negative for abdominal distention, abdominal pain, constipation, diarrhea, nausea and vomiting.   Endocrine: Negative for polyuria.   Genitourinary: Negative for decreased urine volume, dysuria, flank pain, frequency and hematuria.   Musculoskeletal: Negative for back pain and gait problem.   Skin: Negative for rash.   Neurological: Negative for syncope, speech difficulty, weakness, light-headedness and headaches.   Psychiatric/Behavioral: Negative for confusion, hallucinations and sleep disturbance.     Objective:     Vital Signs (Most Recent):  Temp: 98.2 °F (36.8 °C) (07/10/20 1528)  Pulse: 65 (07/10/20 1528)  Resp: 18 (07/10/20 1528)  BP: (!) 114/56 (07/10/20 1528)  SpO2: 96 % (07/10/20 1528) Vital Signs (24h Range):  Temp:  [98 °F (36.7 °C)-98.9 °F (37.2 °C)] 98.2 °F (36.8 °C)  Pulse:  [62-94] 65  Resp:  [17-24] 18  SpO2:  [95 %-100 %] 96 %  BP: ()/(55-64) 114/56     Weight: 62.5 kg (137 lb 12.6 oz)  Body mass index is 33.22 kg/m².    Intake/Output Summary (Last 24 hours) at 7/10/2020 1534  Last data filed at 7/10/2020 0300  Gross per 24 hour   Intake 600 ml   Output 125 ml   Net 475 ml      Physical Exam  Constitutional:       General: She is not in acute distress.     Appearance: She is well-developed. She is not diaphoretic.   HENT:      Head: Normocephalic and atraumatic.      Mouth/Throat:      Pharynx: No oropharyngeal exudate.   Eyes:      Conjunctiva/sclera: Conjunctivae normal.      Pupils: Pupils are  equal, round, and reactive to light.   Neck:      Musculoskeletal: Normal range of motion and neck supple.      Thyroid: No thyromegaly.      Vascular: No JVD.   Cardiovascular:      Rate and Rhythm: Normal rate and regular rhythm.      Heart sounds: Normal heart sounds. No murmur.   Pulmonary:      Effort: Pulmonary effort is normal. No respiratory distress.      Breath sounds: No wheezing or rales.      Comments: Bronchial breath sounds kostas.   Chest:      Chest wall: No tenderness.   Abdominal:      General: Bowel sounds are normal. There is no distension.      Palpations: Abdomen is soft.      Tenderness: There is no abdominal tenderness. There is no guarding or rebound.   Musculoskeletal: Normal range of motion.   Lymphadenopathy:      Cervical: No cervical adenopathy.   Skin:     General: Skin is warm and dry.      Findings: No rash.   Neurological:      Mental Status: She is alert and oriented to person, place, and time.      Cranial Nerves: No cranial nerve deficit.      Sensory: No sensory deficit.      Deep Tendon Reflexes: Reflexes normal.         Significant Labs:   CBC:   Recent Labs   Lab 07/09/20  1255 07/10/20  0436   WBC 6.13 4.84   HGB 12.8 12.7   HCT 39.4 40.3    162     CMP:   Recent Labs   Lab 07/09/20  1255 07/10/20  0436    141   K 4.0 4.0    103   CO2 28 28   * 121*   BUN 9 9   CREATININE 0.6 0.6   CALCIUM 8.7 9.4   PROT 7.1  --    ALBUMIN 3.5  --    BILITOT 0.4  --    ALKPHOS 91  --    AST 25  --    ALT 25  --    ANIONGAP 10 10   EGFRNONAA >60 >60       Significant Imaging:

## 2020-07-10 NOTE — PLAN OF CARE
POC reviewed, verbalized understanding. Pt remained free from falls, fall precautions in place. Pt is NSR on monitor, [id number]. VSS. No other c/o at this time. PIV intact. 2L NC. Call bell and personal belongings within reach. Hourly rounding complete. Reminded to call for assistance. Will continue to monitor.

## 2020-07-10 NOTE — CARE UPDATE
Remdesivir FDA EUA Verbal Consent  The patient or parent/caregiver has been provided with the remdesivir Fact Sheet for Patients and Parents/Caregivers and has been counseled that the FDA has authorized the emergency use of remdesivir, which is not an FDA approved drug. The significant known and potential risks and benefits are unknown. The patient or parent/caregiver has been given the option to accept or refuse and has verbally agreed to receive remdesivir. Daily labs will be ordered and monitoring for Serious Adverse Events will be performed.    Dr. Morrison

## 2020-07-10 NOTE — PROGRESS NOTES
"[facility] Medicine  Progress Note    Patient Name: Penny Pereira  MRN: [id number]  Patient Class: IP- Inpatient   Admission Date: 7/9/2020  Length of Stay: 1 days  Attending Physician: Dashawn Morrison MD  Primary Care Provider: Rob Price MD        Subjective:     Principal Problem:Viral pneumonia, unspecified        HPI:  51 y/o WF with hx of asthma, osteogenesis imperfecta, chronic pain to ED with c/o worsening SOB, NP cough, HA, nausea, poor appetite over the past 3 days, with associated "low grade" fever (Tmax 100.0) beginning this AM, in spite of increased usage of her home nebulizers/inhalers. Denies CP, edema, palpitations, wheezing, orthopnea, PND, abdominal pain, N/V/D, constipation, dysuria, dizziness, weakness, falls/trauma, focal deficits - pt is typically active and independent with ADLs and ambulates with a walker at home. Pt was seen in ED on 07/07/20 and diagnosed with COVID-19 with bilateral groundglass opacifications - sent home with po azithromycin but has continued to decline. Hypoxemic on RA today (89%), with tachypnea, and with slightly elevated troponin on labs (no CP) - O2 saturation improved to 100% on 2L NC and pt states she feels "much better" at this time. Hospital medicine contacted and pt placed in observation. Pt is full code. Surrogate decision maker is sister, Farhana Solomon.     Overview/Hospital Course:  7/10- Afebrile . SpO2 96% on FiO2 28% . Pt feels slightly better . Remdesivir fact sheet obtained and pt agreed to start treatment .     Interval History:   Afebrile . SpO2 96% on FiO2 28% . Pt feels slightly better . Remdesivir fact sheet obtained and pt agreed to start treatment      Review of Systems   Constitutional: Negative for activity change, appetite change and fever.   HENT: Negative for sore throat.    Eyes: Negative for visual disturbance.   Respiratory: Positive for cough and shortness of breath. Negative for chest tightness.  "   Cardiovascular: Negative for chest pain, palpitations and leg swelling.   Gastrointestinal: Negative for abdominal distention, abdominal pain, constipation, diarrhea, nausea and vomiting.   Endocrine: Negative for polyuria.   Genitourinary: Negative for decreased urine volume, dysuria, flank pain, frequency and hematuria.   Musculoskeletal: Negative for back pain and gait problem.   Skin: Negative for rash.   Neurological: Negative for syncope, speech difficulty, weakness, light-headedness and headaches.   Psychiatric/Behavioral: Negative for confusion, hallucinations and sleep disturbance.     Objective:     Vital Signs (Most Recent):  Temp: 98.2 °F (36.8 °C) (07/10/20 1528)  Pulse: 65 (07/10/20 1528)  Resp: 18 (07/10/20 1528)  BP: (!) 114/56 (07/10/20 1528)  SpO2: 96 % (07/10/20 1528) Vital Signs (24h Range):  Temp:  [98 °F (36.7 °C)-98.9 °F (37.2 °C)] 98.2 °F (36.8 °C)  Pulse:  [62-94] 65  Resp:  [17-24] 18  SpO2:  [95 %-100 %] 96 %  BP: ()/(55-64) 114/56     Weight: 62.5 kg (137 lb 12.6 oz)  Body mass index is 33.22 kg/m².    Intake/Output Summary (Last 24 hours) at 7/10/2020 1534  Last data filed at 7/10/2020 0300  Gross per 24 hour   Intake 600 ml   Output 125 ml   Net 475 ml      Physical Exam  Constitutional:       General: She is not in acute distress.     Appearance: She is well-developed. She is not diaphoretic.   HENT:      Head: Normocephalic and atraumatic.      Mouth/Throat:      Pharynx: No oropharyngeal exudate.   Eyes:      Conjunctiva/sclera: Conjunctivae normal.      Pupils: Pupils are equal, round, and reactive to light.   Neck:      Musculoskeletal: Normal range of motion and neck supple.      Thyroid: No thyromegaly.      Vascular: No JVD.   Cardiovascular:      Rate and Rhythm: Normal rate and regular rhythm.      Heart sounds: Normal heart sounds. No murmur.   Pulmonary:      Effort: Pulmonary effort is normal. No respiratory distress.      Breath sounds: No wheezing or rales.       Comments: Bronchial breath sounds kostas.   Chest:      Chest wall: No tenderness.   Abdominal:      General: Bowel sounds are normal. There is no distension.      Palpations: Abdomen is soft.      Tenderness: There is no abdominal tenderness. There is no guarding or rebound.   Musculoskeletal: Normal range of motion.   Lymphadenopathy:      Cervical: No cervical adenopathy.   Skin:     General: Skin is warm and dry.      Findings: No rash.   Neurological:      Mental Status: She is alert and oriented to person, place, and time.      Cranial Nerves: No cranial nerve deficit.      Sensory: No sensory deficit.      Deep Tendon Reflexes: Reflexes normal.         Significant Labs:   CBC:   Recent Labs   Lab 07/09/20  1255 07/10/20  0436   WBC 6.13 4.84   HGB 12.8 12.7   HCT 39.4 40.3    162     CMP:   Recent Labs   Lab 07/09/20  1255 07/10/20  0436    141   K 4.0 4.0    103   CO2 28 28   * 121*   BUN 9 9   CREATININE 0.6 0.6   CALCIUM 8.7 9.4   PROT 7.1  --    ALBUMIN 3.5  --    BILITOT 0.4  --    ALKPHOS 91  --    AST 25  --    ALT 25  --    ANIONGAP 10 10   EGFRNONAA >60 >60       Significant Imaging:       Assessment/Plan:      * Viral pneumonia r/t COVID-19 infection  Supplemental O2 prn  Continue IV rocephin/azithromycin for now  Decadron 6mg daily  BC x2 pending  Albuterol inhaler prn SOB/wheezing  Tylenol prn fever  Continuous pulse oximetry  Remedesivir started - Day #1       Elevated troponin  Likely r/t hypoxia in the setting of COVID pneumonia   Trend troponin          Mild intermittent asthma without complication  Continue decadron as ordered  Supplemental O2 prn  Continue home meds including symbicort MDI  Albuterol inhaler prn SOB/wheezing      COVID-19 virus infection  Remdesivir fact sheet given, discussed with pt - awaiting patient decision  Continue decadron 6mg/day  LDH, CRP, procalcitonin, ferritin, Ddimer q48 hours  Vitamin D level pending  Supplement Zinc, Vitamin D, Vitamin  C, MVI  VTE prophylaxis - lovenox, SCDs  - Infection Control notified     - Isolation:   - Airborne, Contact and Droplet Precautions  - Cohort patients into COVID units  - N95 mask, wear eye protection  - 20 second hand hygiene              - Limit visitors per hospital policy              - Consolidating lab draws, nursing care, provider visits, and interventions  7/10- Remdesivir started , Day #1         VTE Risk Mitigation (From admission, onward)         Ordered     enoxaparin injection 40 mg  Every 24 hours      07/09/20 1534     IP VTE HIGH RISK PATIENT  Once      07/09/20 1534     Place sequential compression device  Until discontinued      07/09/20 1534                      Dashawn Morrison MD  Department of Hospital Medicine   [facility]

## 2020-07-10 NOTE — PLAN OF CARE
Due to covid status initial assessment completed via telephone. Patient is independent with adls and iadls. Patient's son, daughter and granddaughter liver with her and will be her primary help upon discharge.  Patient denies any post hospital needs or services at this time.  Will need home oxygen evaluation upon discharge.           D/C Plan: home  PCP: MENA Price MD  Preferred Pharmacy:[facility]  Discharge transportation:pov  [facility]: declined  Pharmacy Bedside Delivery: yes           07/10/20 1345   Discharge Assessment   Assessment Type Discharge Planning Assessment   Confirmed/corrected address and phone number on facesheet? Yes   Assessment information obtained from? Patient;Medical Record   Expected Length of Stay (days)   (tbd)   Communicated expected length of stay with patient/caregiver yes   Prior to hospitilization cognitive status: Alert/Oriented   Prior to hospitalization functional status: Independent;Assistive Equipment   Current cognitive status: Alert/Oriented   Current Functional Status: Needs Assistance   Facility Arrived From: home   Lives With child(rob), adult;grandchild(rob)   Able to Return to Prior Arrangements yes   Is patient able to care for self after discharge? Yes   Who are your caregiver(s) and their phone number(s)? Farhana Solomon ( sister ) [phone]   Patient's perception of discharge disposition home or selfcare   Readmission Within the Last 30 Days no previous admission in last 30 days   Patient currently being followed by outpatient case management? No   Patient currently receives any other outside agency services? No   Equipment Currently Used at Home nebulizer;walker, rolling  (purchased)   Do you have any problems affording any of your prescribed medications? No   Is the patient taking medications as prescribed? yes   Does the patient have transportation home? Yes   Transportation Anticipated family or friend will provide   Does the patient receive services at  the [facility]? No   Discharge Plan A Home with family   DME Needed Upon Discharge  oxygen   Patient/Family in Agreement with Plan yes

## 2020-07-10 NOTE — ASSESSMENT & PLAN NOTE
Supplemental O2 prn  Continue IV rocephin/azithromycin for now  Decadron 6mg daily  BC x2 pending  Albuterol inhaler prn SOB/wheezing  Tylenol prn fever  Continuous pulse oximetry  Remedesivir started - Day #1

## 2020-07-11 LAB
ALBUMIN SERPL BCP-MCNC: 3.1 G/DL (ref 3.5–5.2)
ALP SERPL-CCNC: 71 U/L (ref 55–135)
ALT SERPL W/O P-5'-P-CCNC: 20 U/L (ref 10–44)
ANION GAP SERPL CALC-SCNC: 11 MMOL/L (ref 8–16)
AST SERPL-CCNC: 18 U/L (ref 10–40)
BASOPHILS # BLD AUTO: 0.01 K/UL (ref 0–0.2)
BASOPHILS NFR BLD: 0.1 % (ref 0–1.9)
BILIRUB SERPL-MCNC: 0.3 MG/DL (ref 0.1–1)
BUN SERPL-MCNC: 18 MG/DL (ref 6–20)
CALCIUM SERPL-MCNC: 9 MG/DL (ref 8.7–10.5)
CHLORIDE SERPL-SCNC: 103 MMOL/L (ref 95–110)
CO2 SERPL-SCNC: 27 MMOL/L (ref 23–29)
CREAT SERPL-MCNC: 0.7 MG/DL (ref 0.5–1.4)
CRP SERPL-MCNC: 16.7 MG/L (ref 0–8.2)
D DIMER PPP IA.FEU-MCNC: 0.27 MG/L FEU
DIFFERENTIAL METHOD: ABNORMAL
EOSINOPHIL # BLD AUTO: 0 K/UL (ref 0–0.5)
EOSINOPHIL NFR BLD: 0 % (ref 0–8)
ERYTHROCYTE [DISTWIDTH] IN BLOOD BY AUTOMATED COUNT: 13.6 % (ref 11.5–14.5)
EST. GFR  (AFRICAN AMERICAN): >60 ML/MIN/1.73 M^2
EST. GFR  (NON AFRICAN AMERICAN): >60 ML/MIN/1.73 M^2
FERRITIN SERPL-MCNC: 279 NG/ML (ref 20–300)
GLUCOSE SERPL-MCNC: 133 MG/DL (ref 70–110)
HCT VFR BLD AUTO: 35.6 % (ref 37–48.5)
HGB BLD-MCNC: 11.2 G/DL (ref 12–16)
IMM GRANULOCYTES # BLD AUTO: 0.05 K/UL (ref 0–0.04)
IMM GRANULOCYTES NFR BLD AUTO: 0.7 % (ref 0–0.5)
LDH SERPL L TO P-CCNC: 265 U/L (ref 110–260)
LYMPHOCYTES # BLD AUTO: 1.5 K/UL (ref 1–4.8)
LYMPHOCYTES NFR BLD: 21.5 % (ref 18–48)
MAGNESIUM SERPL-MCNC: 1.8 MG/DL (ref 1.6–2.6)
MCH RBC QN AUTO: 27.7 PG (ref 27–31)
MCHC RBC AUTO-ENTMCNC: 31.5 G/DL (ref 32–36)
MCV RBC AUTO: 88 FL (ref 82–98)
MONOCYTES # BLD AUTO: 0.5 K/UL (ref 0.3–1)
MONOCYTES NFR BLD: 6.7 % (ref 4–15)
NEUTROPHILS # BLD AUTO: 4.8 K/UL (ref 1.8–7.7)
NEUTROPHILS NFR BLD: 71 % (ref 38–73)
NRBC BLD-RTO: 0 /100 WBC
PLATELET # BLD AUTO: 152 K/UL (ref 150–350)
PMV BLD AUTO: 9.5 FL (ref 9.2–12.9)
POTASSIUM SERPL-SCNC: 3.7 MMOL/L (ref 3.5–5.1)
PROCALCITONIN SERPL IA-MCNC: 0.03 NG/ML
PROT SERPL-MCNC: 6.4 G/DL (ref 6–8.4)
RBC # BLD AUTO: 4.05 M/UL (ref 4–5.4)
SODIUM SERPL-SCNC: 141 MMOL/L (ref 136–145)
WBC # BLD AUTO: 6.83 K/UL (ref 3.9–12.7)

## 2020-07-11 PROCEDURE — 83615 LACTATE (LD) (LDH) ENZYME: CPT

## 2020-07-11 PROCEDURE — 80053 COMPREHEN METABOLIC PANEL: CPT

## 2020-07-11 PROCEDURE — 85025 COMPLETE CBC W/AUTO DIFF WBC: CPT

## 2020-07-11 PROCEDURE — 94640 AIRWAY INHALATION TREATMENT: CPT

## 2020-07-11 PROCEDURE — 36415 COLL VENOUS BLD VENIPUNCTURE: CPT

## 2020-07-11 PROCEDURE — 84145 PROCALCITONIN (PCT): CPT

## 2020-07-11 PROCEDURE — 25000003 PHARM REV CODE 250: Performed by: NURSE PRACTITIONER

## 2020-07-11 PROCEDURE — 86140 C-REACTIVE PROTEIN: CPT

## 2020-07-11 PROCEDURE — 85379 FIBRIN DEGRADATION QUANT: CPT

## 2020-07-11 PROCEDURE — 94761 N-INVAS EAR/PLS OXIMETRY MLT: CPT

## 2020-07-11 PROCEDURE — 83735 ASSAY OF MAGNESIUM: CPT

## 2020-07-11 PROCEDURE — 82728 ASSAY OF FERRITIN: CPT

## 2020-07-11 PROCEDURE — 25000003 PHARM REV CODE 250: Performed by: INTERNAL MEDICINE

## 2020-07-11 PROCEDURE — A4216 STERILE WATER/SALINE, 10 ML: HCPCS | Performed by: NURSE PRACTITIONER

## 2020-07-11 PROCEDURE — 25000242 PHARM REV CODE 250 ALT 637 W/ HCPCS: Performed by: NURSE PRACTITIONER

## 2020-07-11 PROCEDURE — 21400001 HC TELEMETRY ROOM

## 2020-07-11 PROCEDURE — 63600175 PHARM REV CODE 636 W HCPCS: Performed by: NURSE PRACTITIONER

## 2020-07-11 PROCEDURE — 27000221 HC OXYGEN, UP TO 24 HOURS

## 2020-07-11 RX ORDER — ALBUTEROL SULFATE 90 UG/1
2 AEROSOL, METERED RESPIRATORY (INHALATION) EVERY 6 HOURS
Status: DISCONTINUED | OUTPATIENT
Start: 2020-07-11 | End: 2020-07-12 | Stop reason: HOSPADM

## 2020-07-11 RX ADMIN — FLUTICASONE FUROATE AND VILANTEROL TRIFENATATE 1 PUFF: 100; 25 POWDER RESPIRATORY (INHALATION) at 02:07

## 2020-07-11 RX ADMIN — DEXAMETHASONE 6 MG: 4 TABLET ORAL at 09:07

## 2020-07-11 RX ADMIN — AZITHROMYCIN MONOHYDRATE 500 MG: 500 INJECTION, POWDER, LYOPHILIZED, FOR SOLUTION INTRAVENOUS at 06:07

## 2020-07-11 RX ADMIN — Medication 10 ML: at 10:07

## 2020-07-11 RX ADMIN — THERA TABS 1 TABLET: TAB at 09:07

## 2020-07-11 RX ADMIN — OYSTER SHELL CALCIUM WITH VITAMIN D 1 TABLET: 500; 200 TABLET, FILM COATED ORAL at 09:07

## 2020-07-11 RX ADMIN — OXYCODONE HYDROCHLORIDE AND ACETAMINOPHEN 500 MG: 500 TABLET ORAL at 09:07

## 2020-07-11 RX ADMIN — Medication 220 MG: at 09:07

## 2020-07-11 RX ADMIN — CEFTRIAXONE 1 G: 1 INJECTION, SOLUTION INTRAVENOUS at 06:07

## 2020-07-11 RX ADMIN — ENOXAPARIN SODIUM 40 MG: 100 INJECTION SUBCUTANEOUS at 05:07

## 2020-07-11 RX ADMIN — OYSTER SHELL CALCIUM WITH VITAMIN D 1 TABLET: 500; 200 TABLET, FILM COATED ORAL at 10:07

## 2020-07-11 RX ADMIN — Medication 10 ML: at 05:07

## 2020-07-11 RX ADMIN — ONDANSETRON 4 MG: 4 TABLET, ORALLY DISINTEGRATING ORAL at 10:07

## 2020-07-11 RX ADMIN — OXYCODONE HYDROCHLORIDE AND ACETAMINOPHEN 500 MG: 500 TABLET ORAL at 10:07

## 2020-07-11 RX ADMIN — SODIUM CHLORIDE 100 MG: 9 INJECTION, SOLUTION INTRAVENOUS at 05:07

## 2020-07-11 RX ADMIN — ALBUTEROL SULFATE 2 PUFF: 90 AEROSOL, METERED RESPIRATORY (INHALATION) at 07:07

## 2020-07-11 RX ADMIN — MONTELUKAST 10 MG: 10 TABLET, FILM COATED ORAL at 09:07

## 2020-07-11 RX ADMIN — VITAMIN D, TAB 1000IU (100/BT) 1000 UNITS: 25 TAB at 09:07

## 2020-07-11 RX ADMIN — Medication 10 ML: at 02:07

## 2020-07-11 NOTE — PLAN OF CARE
Pt with uneventful shift. She is on 2L O2 via NC. NAD noted, VSS. Pt denies needs or pain at this time. Day 2 of remdesivir. Also receiving IV rocephin and azithromycin. Possible DC tomorrow. Will continue to monitor.

## 2020-07-11 NOTE — ASSESSMENT & PLAN NOTE
Continue decadron 6mg/day  LDH, CRP, procalcitonin, ferritin, Ddimer q48 hours  Supplement Zinc, Vitamin D, Vitamin C, MVI  VTE prophylaxis - lovenox, SCDs  - Infection Control notified     - Isolation:   - Airborne, Contact and Droplet Precautions  - Cohort patients into COVID units  - N95 mask, wear eye protection  - 20 second hand hygiene              - Limit visitors per hospital policy              - Consolidating lab draws, nursing care, provider visits, and interventions  7/11 Remdesivir started and Decadron

## 2020-07-11 NOTE — PROGRESS NOTES
"[facility] Medicine  Progress Note    Patient Name: Penny Pereira  MRN: [id number]  Patient Class: IP- Inpatient   Admission Date: 7/9/2020  Length of Stay: 2 days  Attending Physician: Alvaro Rodríguez MD  Primary Care Provider: Rob Price MD        Subjective:     Principal Problem:Viral pneumonia, unspecified        HPI:  49 y/o WF with hx of asthma, osteogenesis imperfecta, chronic pain to ED with c/o worsening SOB, NP cough, HA, nausea, poor appetite over the past 3 days, with associated "low grade" fever (Tmax 100.0) beginning this AM, in spite of increased usage of her home nebulizers/inhalers. Denies CP, edema, palpitations, wheezing, orthopnea, PND, abdominal pain, N/V/D, constipation, dysuria, dizziness, weakness, falls/trauma, focal deficits - pt is typically active and independent with ADLs and ambulates with a walker at home. Pt was seen in ED on 07/07/20 and diagnosed with COVID-19 with bilateral groundglass opacifications - sent home with po azithromycin but has continued to decline. Hypoxemic on RA today (89%), with tachypnea, and with slightly elevated troponin on labs (no CP) - O2 saturation improved to 100% on 2L NC and pt states she feels "much better" at this time. Hospital medicine contacted and pt placed in observation. Pt is full code. Surrogate decision maker is sister, Farhana Solomon.     Overview/Hospital Course:  7/10- Afebrile . SpO2 96% on FiO2 28% . Pt feels slightly better . Remdesivir fact sheet obtained and pt agreed to start treatment .   7/11 - Afebrile on 2 L oxygen, normal respiratory rate, normal pulse. Remdesivir and Decadron started 7/10/2020  IV Rocephin and Azithromycin.     Interval History: Says that her breathing is better.     Review of Systems   Constitutional: Negative for activity change, appetite change and fever.   HENT: Negative for sore throat.    Eyes: Negative for visual disturbance.   Respiratory: Positive for cough and " shortness of breath. Negative for chest tightness.    Cardiovascular: Negative for chest pain, palpitations and leg swelling.   Gastrointestinal: Negative for abdominal distention, abdominal pain, constipation, diarrhea, nausea and vomiting.   Endocrine: Negative for polyuria.   Genitourinary: Negative for decreased urine volume, dysuria, flank pain, frequency and hematuria.   Musculoskeletal: Negative for back pain and gait problem.   Skin: Negative for rash.   Neurological: Negative for syncope, speech difficulty, weakness, light-headedness and headaches.   Psychiatric/Behavioral: Negative for confusion, hallucinations and sleep disturbance.     Objective:     Vital Signs (Most Recent):  Temp: 98.9 °F (37.2 °C) (07/11/20 1214)  Pulse: 76 (07/11/20 1500)  Resp: 18 (07/11/20 1448)  BP: 119/65 (07/11/20 1214)  SpO2: 96 % (07/11/20 1448) Vital Signs (24h Range):  Temp:  [98.2 °F (36.8 °C)-99.2 °F (37.3 °C)] 98.9 °F (37.2 °C)  Pulse:  [72-90] 76  Resp:  [16-20] 18  SpO2:  [95 %-98 %] 96 %  BP: (106-126)/(54-65) 119/65     Weight: 67 kg (147 lb 11.3 oz)  Body mass index is 35.61 kg/m².    Intake/Output Summary (Last 24 hours) at 7/11/2020 1650  Last data filed at 7/11/2020 0545  Gross per 24 hour   Intake 1530 ml   Output --   Net 1530 ml      Physical Exam  Constitutional:       General: She is not in acute distress.     Appearance: She is well-developed. She is not diaphoretic.   HENT:      Head: Normocephalic and atraumatic.      Mouth/Throat:      Pharynx: No oropharyngeal exudate.   Eyes:      Conjunctiva/sclera: Conjunctivae normal.      Pupils: Pupils are equal, round, and reactive to light.   Neck:      Musculoskeletal: Normal range of motion and neck supple.      Thyroid: No thyromegaly.      Vascular: No JVD.   Cardiovascular:      Rate and Rhythm: Normal rate and regular rhythm.      Heart sounds: Normal heart sounds. No murmur.   Pulmonary:      Effort: Pulmonary effort is normal. No respiratory distress.       Breath sounds: No wheezing or rales.      Comments: Bronchial breath sounds kostas.   Chest:      Chest wall: No tenderness.   Abdominal:      General: Bowel sounds are normal. There is no distension.      Palpations: Abdomen is soft.      Tenderness: There is no abdominal tenderness. There is no guarding or rebound.   Musculoskeletal: Normal range of motion.   Lymphadenopathy:      Cervical: No cervical adenopathy.   Skin:     General: Skin is warm and dry.      Findings: No rash.   Neurological:      Mental Status: She is alert and oriented to person, place, and time.      Cranial Nerves: No cranial nerve deficit.      Sensory: No sensory deficit.      Deep Tendon Reflexes: Reflexes normal.         Significant Labs:   CBC:   Recent Labs   Lab 07/10/20  0436 07/11/20  0555   WBC 4.84 6.83   HGB 12.7 11.2*   HCT 40.3 35.6*    152     CMP:   Recent Labs   Lab 07/10/20  0436 07/11/20  0555    141   K 4.0 3.7    103   CO2 28 27   * 133*   BUN 9 18   CREATININE 0.6 0.7   CALCIUM 9.4 9.0   PROT  --  6.4   ALBUMIN  --  3.1*   BILITOT  --  0.3   ALKPHOS  --  71   AST  --  18   ALT  --  20   ANIONGAP 10 11   EGFRNONAA >60 >60     All pertinent labs within the past 24 hours have been reviewed.    Significant Imaging: I have reviewed all pertinent imaging results/findings within the past 24 hours.      Assessment/Plan:      * Viral pneumonia r/t COVID-19 infection  Supplemental O2 prn  Continue empiric IV rocephin/azithromycin   Decadron 6mg daily  BC x2 >>>> NGTD  Albuterol inhaler SOB/wheezing  Tylenol prn fever  Continuous pulse oximetry  Remedesivir started       Elevated troponin  Likely r/t hypoxia in the setting of COVID pneumonia   Trend troponin- has trended down          Mild intermittent asthma without complication  Continue decadron as ordered  Supplemental O2 prn  Continue home meds including symbicort MDI  Albuterol inhaler  SOB/wheezing      COVID-19 virus infection  Continue decadron  6mg/day  LDH, CRP, procalcitonin, ferritin, Ddimer q48 hours  Supplement Zinc, Vitamin D, Vitamin C, MVI  VTE prophylaxis - lovenox, SCDs  - Infection Control notified     - Isolation:   - Airborne, Contact and Droplet Precautions  - Cohort patients into COVID units  - N95 mask, wear eye protection  - 20 second hand hygiene              - Limit visitors per hospital policy              - Consolidating lab draws, nursing care, provider visits, and interventions  7/11 Remdesivir started and Decadron        VTE Risk Mitigation (From admission, onward)         Ordered     enoxaparin injection 40 mg  Every 24 hours      07/09/20 1534     IP VTE HIGH RISK PATIENT  Once      07/09/20 1534     Place sequential compression device  Until discontinued      07/09/20 1534                      Shana Kunz NP  Department of Hospital Medicine   [facility]

## 2020-07-11 NOTE — ASSESSMENT & PLAN NOTE
Supplemental O2 prn  Continue empiric IV rocephin/azithromycin   Decadron 6mg daily  BC x2 >>>> NGTD  Albuterol inhaler SOB/wheezing  Tylenol prn fever  Continuous pulse oximetry  Remedesivir started

## 2020-07-11 NOTE — ASSESSMENT & PLAN NOTE
Continue decadron as ordered  Supplemental O2 prn  Continue home meds including symbicort MDI  Albuterol inhaler  SOB/wheezing

## 2020-07-11 NOTE — PLAN OF CARE
Ongoing (interventions implemented as appropriate)  Pt is alert and oriented.  VSS  Pt able to make needs known.  Pt remained afebrile throughout this shift.   Pt remained free of falls this shift.   Pt denies pain this shift.  Plan of care reviewed. Patient verbalizes understanding.   Pt moving/turing independent. Frequent weight shifting encouraged.  Patient normal sinus rhythm on monitor.   Bed low, side rails up x 2, wheels locked, call light in reach.   Hourly rounding completed.   Will continue to monitor.

## 2020-07-11 NOTE — SUBJECTIVE & OBJECTIVE
Interval History: Says that her breathing is better.     Review of Systems   Constitutional: Negative for activity change, appetite change and fever.   HENT: Negative for sore throat.    Eyes: Negative for visual disturbance.   Respiratory: Positive for cough and shortness of breath. Negative for chest tightness.    Cardiovascular: Negative for chest pain, palpitations and leg swelling.   Gastrointestinal: Negative for abdominal distention, abdominal pain, constipation, diarrhea, nausea and vomiting.   Endocrine: Negative for polyuria.   Genitourinary: Negative for decreased urine volume, dysuria, flank pain, frequency and hematuria.   Musculoskeletal: Negative for back pain and gait problem.   Skin: Negative for rash.   Neurological: Negative for syncope, speech difficulty, weakness, light-headedness and headaches.   Psychiatric/Behavioral: Negative for confusion, hallucinations and sleep disturbance.     Objective:     Vital Signs (Most Recent):  Temp: 98.9 °F (37.2 °C) (07/11/20 1214)  Pulse: 76 (07/11/20 1500)  Resp: 18 (07/11/20 1448)  BP: 119/65 (07/11/20 1214)  SpO2: 96 % (07/11/20 1448) Vital Signs (24h Range):  Temp:  [98.2 °F (36.8 °C)-99.2 °F (37.3 °C)] 98.9 °F (37.2 °C)  Pulse:  [72-90] 76  Resp:  [16-20] 18  SpO2:  [95 %-98 %] 96 %  BP: (106-126)/(54-65) 119/65     Weight: 67 kg (147 lb 11.3 oz)  Body mass index is 35.61 kg/m².    Intake/Output Summary (Last 24 hours) at 7/11/2020 1650  Last data filed at 7/11/2020 0545  Gross per 24 hour   Intake 1530 ml   Output --   Net 1530 ml      Physical Exam  Constitutional:       General: She is not in acute distress.     Appearance: She is well-developed. She is not diaphoretic.   HENT:      Head: Normocephalic and atraumatic.      Mouth/Throat:      Pharynx: No oropharyngeal exudate.   Eyes:      Conjunctiva/sclera: Conjunctivae normal.      Pupils: Pupils are equal, round, and reactive to light.   Neck:      Musculoskeletal: Normal range of motion and neck  supple.      Thyroid: No thyromegaly.      Vascular: No JVD.   Cardiovascular:      Rate and Rhythm: Normal rate and regular rhythm.      Heart sounds: Normal heart sounds. No murmur.   Pulmonary:      Effort: Pulmonary effort is normal. No respiratory distress.      Breath sounds: No wheezing or rales.      Comments: Bronchial breath sounds kostas.   Chest:      Chest wall: No tenderness.   Abdominal:      General: Bowel sounds are normal. There is no distension.      Palpations: Abdomen is soft.      Tenderness: There is no abdominal tenderness. There is no guarding or rebound.   Musculoskeletal: Normal range of motion.   Lymphadenopathy:      Cervical: No cervical adenopathy.   Skin:     General: Skin is warm and dry.      Findings: No rash.   Neurological:      Mental Status: She is alert and oriented to person, place, and time.      Cranial Nerves: No cranial nerve deficit.      Sensory: No sensory deficit.      Deep Tendon Reflexes: Reflexes normal.         Significant Labs:   CBC:   Recent Labs   Lab 07/10/20  0436 07/11/20  0555   WBC 4.84 6.83   HGB 12.7 11.2*   HCT 40.3 35.6*    152     CMP:   Recent Labs   Lab 07/10/20  0436 07/11/20  0555    141   K 4.0 3.7    103   CO2 28 27   * 133*   BUN 9 18   CREATININE 0.6 0.7   CALCIUM 9.4 9.0   PROT  --  6.4   ALBUMIN  --  3.1*   BILITOT  --  0.3   ALKPHOS  --  71   AST  --  18   ALT  --  20   ANIONGAP 10 11   EGFRNONAA >60 >60     All pertinent labs within the past 24 hours have been reviewed.    Significant Imaging: I have reviewed all pertinent imaging results/findings within the past 24 hours.

## 2020-07-12 ENCOUNTER — NURSE TRIAGE (OUTPATIENT)
Dept: ADMINISTRATIVE | Facility: CLINIC | Age: 50
End: 2020-07-12

## 2020-07-12 VITALS
SYSTOLIC BLOOD PRESSURE: 98 MMHG | HEIGHT: 55 IN | RESPIRATION RATE: 18 BRPM | OXYGEN SATURATION: 97 % | BODY MASS INDEX: 34.23 KG/M2 | TEMPERATURE: 99 F | HEART RATE: 67 BPM | WEIGHT: 147.94 LBS | DIASTOLIC BLOOD PRESSURE: 49 MMHG

## 2020-07-12 PROBLEM — R79.89 ELEVATED TROPONIN: Status: RESOLVED | Noted: 2020-07-09 | Resolved: 2020-07-12

## 2020-07-12 LAB
ALBUMIN SERPL BCP-MCNC: 3.2 G/DL (ref 3.5–5.2)
ALP SERPL-CCNC: 75 U/L (ref 55–135)
ALT SERPL W/O P-5'-P-CCNC: 32 U/L (ref 10–44)
ANION GAP SERPL CALC-SCNC: 11 MMOL/L (ref 8–16)
AST SERPL-CCNC: 24 U/L (ref 10–40)
BASOPHILS # BLD AUTO: 0.01 K/UL (ref 0–0.2)
BASOPHILS NFR BLD: 0.2 % (ref 0–1.9)
BILIRUB SERPL-MCNC: 0.2 MG/DL (ref 0.1–1)
BUN SERPL-MCNC: 17 MG/DL (ref 6–20)
CALCIUM SERPL-MCNC: 9.5 MG/DL (ref 8.7–10.5)
CHLORIDE SERPL-SCNC: 102 MMOL/L (ref 95–110)
CO2 SERPL-SCNC: 31 MMOL/L (ref 23–29)
CREAT SERPL-MCNC: 0.7 MG/DL (ref 0.5–1.4)
DIFFERENTIAL METHOD: ABNORMAL
EOSINOPHIL # BLD AUTO: 0 K/UL (ref 0–0.5)
EOSINOPHIL NFR BLD: 0 % (ref 0–8)
ERYTHROCYTE [DISTWIDTH] IN BLOOD BY AUTOMATED COUNT: 13.6 % (ref 11.5–14.5)
EST. GFR  (AFRICAN AMERICAN): >60 ML/MIN/1.73 M^2
EST. GFR  (NON AFRICAN AMERICAN): >60 ML/MIN/1.73 M^2
GLUCOSE SERPL-MCNC: 108 MG/DL (ref 70–110)
HCT VFR BLD AUTO: 36.4 % (ref 37–48.5)
HGB BLD-MCNC: 11.7 G/DL (ref 12–16)
IMM GRANULOCYTES # BLD AUTO: 0.1 K/UL (ref 0–0.04)
IMM GRANULOCYTES NFR BLD AUTO: 1.9 % (ref 0–0.5)
LYMPHOCYTES # BLD AUTO: 1.6 K/UL (ref 1–4.8)
LYMPHOCYTES NFR BLD: 30 % (ref 18–48)
MAGNESIUM SERPL-MCNC: 2.1 MG/DL (ref 1.6–2.6)
MCH RBC QN AUTO: 28 PG (ref 27–31)
MCHC RBC AUTO-ENTMCNC: 32.1 G/DL (ref 32–36)
MCV RBC AUTO: 87 FL (ref 82–98)
MONOCYTES # BLD AUTO: 0.7 K/UL (ref 0.3–1)
MONOCYTES NFR BLD: 12.6 % (ref 4–15)
NEUTROPHILS # BLD AUTO: 2.9 K/UL (ref 1.8–7.7)
NEUTROPHILS NFR BLD: 55.3 % (ref 38–73)
NRBC BLD-RTO: 0 /100 WBC
PLATELET # BLD AUTO: 169 K/UL (ref 150–350)
PMV BLD AUTO: 9.7 FL (ref 9.2–12.9)
POTASSIUM SERPL-SCNC: 4.2 MMOL/L (ref 3.5–5.1)
PROT SERPL-MCNC: 6.7 G/DL (ref 6–8.4)
RBC # BLD AUTO: 4.18 M/UL (ref 4–5.4)
SODIUM SERPL-SCNC: 144 MMOL/L (ref 136–145)
WBC # BLD AUTO: 5.17 K/UL (ref 3.9–12.7)

## 2020-07-12 PROCEDURE — 27000221 HC OXYGEN, UP TO 24 HOURS

## 2020-07-12 PROCEDURE — 63600175 PHARM REV CODE 636 W HCPCS: Performed by: NURSE PRACTITIONER

## 2020-07-12 PROCEDURE — 36415 COLL VENOUS BLD VENIPUNCTURE: CPT

## 2020-07-12 PROCEDURE — A4216 STERILE WATER/SALINE, 10 ML: HCPCS | Performed by: NURSE PRACTITIONER

## 2020-07-12 PROCEDURE — 25000242 PHARM REV CODE 250 ALT 637 W/ HCPCS: Performed by: NURSE PRACTITIONER

## 2020-07-12 PROCEDURE — 80053 COMPREHEN METABOLIC PANEL: CPT

## 2020-07-12 PROCEDURE — 94761 N-INVAS EAR/PLS OXIMETRY MLT: CPT

## 2020-07-12 PROCEDURE — 99900035 HC TECH TIME PER 15 MIN (STAT)

## 2020-07-12 PROCEDURE — 85025 COMPLETE CBC W/AUTO DIFF WBC: CPT

## 2020-07-12 PROCEDURE — 83735 ASSAY OF MAGNESIUM: CPT

## 2020-07-12 PROCEDURE — 94640 AIRWAY INHALATION TREATMENT: CPT

## 2020-07-12 PROCEDURE — 25000003 PHARM REV CODE 250: Performed by: NURSE PRACTITIONER

## 2020-07-12 RX ORDER — CHOLECALCIFEROL (VITAMIN D3) 25 MCG
1000 TABLET ORAL DAILY
Qty: 30 TABLET | Refills: 0 | Status: SHIPPED | OUTPATIENT
Start: 2020-07-13 | End: 2020-07-12 | Stop reason: HOSPADM

## 2020-07-12 RX ORDER — ASCORBIC ACID 500 MG
500 TABLET ORAL 2 TIMES DAILY
COMMUNITY
Start: 2020-07-12

## 2020-07-12 RX ORDER — ZINC SULFATE 50(220)MG
220 CAPSULE ORAL DAILY
COMMUNITY
Start: 2020-07-13

## 2020-07-12 RX ADMIN — ALBUTEROL SULFATE 2 PUFF: 90 AEROSOL, METERED RESPIRATORY (INHALATION) at 07:07

## 2020-07-12 RX ADMIN — DEXAMETHASONE 6 MG: 4 TABLET ORAL at 08:07

## 2020-07-12 RX ADMIN — OYSTER SHELL CALCIUM WITH VITAMIN D 1 TABLET: 500; 200 TABLET, FILM COATED ORAL at 08:07

## 2020-07-12 RX ADMIN — ALBUTEROL SULFATE 2 PUFF: 90 AEROSOL, METERED RESPIRATORY (INHALATION) at 01:07

## 2020-07-12 RX ADMIN — FLUTICASONE FUROATE AND VILANTEROL TRIFENATATE 1 PUFF: 100; 25 POWDER RESPIRATORY (INHALATION) at 08:07

## 2020-07-12 RX ADMIN — VITAMIN D, TAB 1000IU (100/BT) 1000 UNITS: 25 TAB at 08:07

## 2020-07-12 RX ADMIN — OXYCODONE HYDROCHLORIDE AND ACETAMINOPHEN 500 MG: 500 TABLET ORAL at 08:07

## 2020-07-12 RX ADMIN — Medication 220 MG: at 08:07

## 2020-07-12 RX ADMIN — THERA TABS 1 TABLET: TAB at 08:07

## 2020-07-12 RX ADMIN — MONTELUKAST 10 MG: 10 TABLET, FILM COATED ORAL at 08:07

## 2020-07-12 RX ADMIN — Medication 10 ML: at 06:07

## 2020-07-12 NOTE — CONSULTS
[facility] contacted [facility] and obtained approval for oxygen. [facility]vin will deliver portable tank to nurse's station. Selvin will fax orders to [phone].

## 2020-07-12 NOTE — DISCHARGE SUMMARY
"[facility] Medicine  Discharge Summary      Patient Name: Penny Pereira  MRN: [id number]  Admission Date: 7/9/2020  Hospital Length of Stay: 3 days  Discharge Date and Time: 7/12/2020  2:57 PM  Attending Physician: Alvaro Rodríguez MD  Discharging Provider: Shana Kunz NP  Primary Care Provider: Rob Price MD      HPI:   51 y/o WF with hx of asthma, osteogenesis imperfecta, chronic pain to ED with c/o worsening SOB, NP cough, HA, nausea, poor appetite over the past 3 days, with associated "low grade" fever (Tmax 100.0) beginning this AM, in spite of increased usage of her home nebulizers/inhalers. Denies CP, edema, palpitations, wheezing, orthopnea, PND, abdominal pain, N/V/D, constipation, dysuria, dizziness, weakness, falls/trauma, focal deficits - pt is typically active and independent with ADLs and ambulates with a walker at home. Pt was seen in ED on 07/07/20 and diagnosed with COVID-19 with bilateral groundglass opacifications - sent home with po azithromycin but has continued to decline. Hypoxemic on RA today (89%), with tachypnea, and with slightly elevated troponin on labs (no CP) - O2 saturation improved to 100% on 2L NC and pt states she feels "much better" at this time. Hospital medicine contacted and pt placed in observation. Pt is full code. Surrogate decision maker is sister, Farhana Solomon.     * No surgery found *      Hospital Course:   7/10- Afebrile . SpO2 96% on FiO2 28% . Pt feels slightly better . Remdesivir fact sheet obtained and pt agreed to start treatment .   7/11 - Afebrile on 2 L oxygen, normal respiratory rate, normal pulse. Remdesivir and Decadron started 7/10/2020  IV Rocephin and Azithromycin. Proposed discharge tomorrow  7/12/2020 - Pt remained afebrile. Stable vital signs and baseline labs. She did qualify for home oxygen - 2 L nasal cannula at rest as her pulse ox was 88 %. She was discharged to continue Azithromycin and albuterol " "treatments. COVID home monitoring program was ordered. Also, pulse oximeter for home use was ordered. Pt was seen and examined and determined to be safe and stable for discharge. She was advised to follow up with her PCP.      Consults:   Consults (From admission, onward)        Status Ordering Provider     Inpatient consult to Social Work  Once     Provider:  (Not yet assigned)    Completed MARIN CHUNG     Pharmacy Remdesivir Consult  Once     Provider:  (Not yet assigned)    Acknowledged BRAYDEN BOWENS          No new Assessment & Plan notes have been filed under this hospital service since the last note was generated.  Service: Hospital Medicine    Final Active Diagnoses:    Diagnosis Date Noted POA    PRINCIPAL PROBLEM:  Viral pneumonia r/t COVID-19 infection [J12.9] 07/09/2020 Yes    COVID-19 virus infection [U07.1] 07/09/2020 Yes    Mild intermittent asthma without complication [J45.20] 07/09/2020 Yes      Problems Resolved During this Admission:    Diagnosis Date Noted Date Resolved POA    Elevated troponin [R79.89] 07/09/2020 07/12/2020 Yes       Discharged Condition: stable    Disposition: Home or Self Care    Follow Up:    Patient Instructions:      OXYGEN FOR HOME USE     Order Specific Question Answer Comments   Liter Flow 2    Duration Continuous    Qualifying SpO2: 88    Testing done at: Rest    Route nasal cannula    Portable mode: pulse dose acceptable    Device home concentrator with portable unit    Length of need (in months): 99 mos    Patient condition with qualifying saturation  COVID pneumonia   Height: 4' 6" (1.372 m)    Weight: 67.1 kg (147 lb 14.9 oz)    Does patient have medical equipment at home? nebulizer purchased / purchased   Does patient have medical equipment at home? walker, rolling    Alternative treatment measures have been tried or considered and deemed clinically ineffective. Yes    Vendor: [employer]    Expected Date of Delivery: 7/12/2020    Expected Time of " Delivery: 2:41 PM      COVID-19 Surveillance Program     Order Specific Question Answer Comments   Does patient have a smartphone? Yes    Does patient have the [facility] on their smartphone? No      Notify your health care provider if you experience any of the following:  difficulty breathing or increased cough     Notify your health care provider if you experience any of the following:  increased confusion or weakness     Notify your health care provider if you experience any of the following:  persistent nausea and vomiting or diarrhea     COVID-19 Surveillance Program     Order Specific Question Answer Comments   Does patient have a smartphone? Yes    Does patient have the [facility] on their smartphone? No      Activity as tolerated       Significant Diagnostic Studies: Labs:   CMP   Recent Labs   Lab 07/11/20  0555 07/12/20  0527    144   K 3.7 4.2    102   CO2 27 31*   * 108   BUN 18 17   CREATININE 0.7 0.7   CALCIUM 9.0 9.5   PROT 6.4 6.7   ALBUMIN 3.1* 3.2*   BILITOT 0.3 0.2   ALKPHOS 71 75   AST 18 24   ALT 20 32   ANIONGAP 11 11   ESTGFRAFRICA >60 >60   EGFRNONAA >60 >60    and CBC   Recent Labs   Lab 07/11/20  0555 07/12/20  0527   WBC 6.83 5.17   HGB 11.2* 11.7*   HCT 35.6* 36.4*    169       Pending Diagnostic Studies:     None         Medications:  Reconciled Home Medications:      Medication List      START taking these medications    ascorbic acid (vitamin C) 500 MG tablet  Commonly known as: VITAMIN C  Take 1 tablet (500 mg total) by mouth 2 (two) times daily.     multivitamin Tab  Take 1 tablet by mouth once daily.  Start taking on: July 13, 2020     * pulse oximeter device  Commonly known as: pulse oximeter  by Apply Externally route 2 (two) times a day. Use twice daily at 8 AM and 3 PM and [facility] as directed.     * pulse oximeter device  Commonly known as: pulse oximeter  by Apply Externally route 2 (two) times a day. Use twice daily at 8 AM  and 3 PM and record the value in [employer] as directed.     zinc sulfate 220 (50) mg capsule  Commonly known as: ZINCATE  Take 1 capsule (220 mg total) by mouth once daily.  Start taking on: July 13, 2020         * This list has 2 medication(s) that are the same as other medications prescribed for you. Read the directions carefully, and ask your doctor or other care provider to review them with you.            CONTINUE taking these medications    albuterol sulfate 2.5 mg/0.5 mL Nebu  Take 2.5 mg by nebulization every 4 (four) hours as needed. Rescue, Dispense 1 box     alendronate 70 MG tablet  Commonly known as: FOSAMAX  alendronate 70 mg tablet     amitriptyline 50 MG tablet  Commonly known as: ELAVIL  amitriptyline 50 mg tablet     azithromycin 250 MG tablet  Commonly known as: Z-ZULEIMA  Take 1 tablet (250 mg total) by mouth once daily. Take first 2 tablets together, then 1 every day until finished.     budesonide-formoterol 80-4.5 mcg 80-4.5 mcg/actuation Hfaa  Commonly known as: SYMBICORT  budesonide-formoterol HFA 80 mcg-4.5 mcg/actuation aerosol inhaler     calcium-vitamin D 600 mg(1,500mg) -400 unit Tab  Calcium with Vitamin D 600 mg (1,500 mg)-400 unit tablet   Take 2 tablets every day by oral route.     HYDROcodone-acetaminophen 5-325 mg per tablet  Commonly known as: NORCO  Take 1 tablet by mouth every 4 (four) hours as needed.     montelukast 10 mg tablet  Commonly known as: SINGULAIR  montelukast 10 mg tablet     naproxen 375 MG tablet  Commonly known as: NAPROSYN  Take 1 tablet (375 mg total) by mouth 2 (two) times daily with meals.     ondansetron 4 MG tablet  Commonly known as: ZOFRAN  Take 1 tablet (4 mg total) by mouth every 6 (six) hours.     sumatriptan 50 MG tablet  Commonly known as: IMITREX  Take 50 mg by mouth daily as needed for Migraine.        STOP taking these medications    fluconazole 150 MG Tab  Commonly known as: DIFLUCAN     polymyxin B sulf-trimethoprim 10,000 unit- 1 mg/mL  Drop  Commonly known as: POLYTRIM            Indwelling Lines/Drains at time of discharge:   Lines/Drains/Airways     None                 Time spent on the discharge of patient: > 30 minutes  Patient was seen and examined on the date of discharge and determined to be suitable for discharge.         Shana Kunz NP  Department of Hospital Medicine  [facility]

## 2020-07-12 NOTE — PROGRESS NOTES
Home Oxygen Evaluation    Date Performed: 7/12/2020    1) Patient's Home O2 Sat on room air, while at rest: 88        If O2 sats on room air at rest are 88% or below, patient qualifies. No additional testing needed. Document N/A in steps 2 and 3. If 89% or above, complete steps 2.      2) Patient's O2 Sat on room air while exercising: NA        If O2 sats on room air while exercising remain 89% or above patient does not qualify, no further testing needed Document N/A in step 3. If O2 sats on room air while exercising are 88% or below, continue to step 3.      3) Patient's O2 Sat while exercising on O2: NA at NA LPM         (Must show improvement from #2 for patients to qualify)    If O2 sats improve on oxygen, patient qualifies for portable oxygen. If not, the patient does not qualify.

## 2020-07-12 NOTE — NURSING
Discharge instructions reviewed with patient and spouse, both verbalized understanding.  PIV removed, catheter intact.  Tele box removed and returned to monitor room.  Stressed importance to keep and attend all follow up appointments.  Educated patient on the need to make prescribed medication meds.  Home O2 and pulse ox delivered to bedside by case management.  PCT with a wheelchair to the bedside to bring patient to the lobby for discharge.

## 2020-07-12 NOTE — TELEPHONE ENCOUNTER
Contacted pt to enroll in Covid Surveillance program. Verified pt by first and last name and . Pt was discharged from hospital today. Pt has pulse ox from [facility]. Pt got [employer] downloaded but pt unable to log in. Gave pt number to technical support ([phone]). Pt unable to sign consent. Verified contact information. Explained surveillance program. Pt has no other questions at this time. Gave pt OOC number if any other questions or concerns.     Reason for Disposition   General information question, no triage required and triager able to answer question    Protocols used: INFORMATION ONLY CALL - NO TRIAGE-A-

## 2020-07-12 NOTE — TELEPHONE ENCOUNTER
Called patient to review COVID-19 Surveillance Program enrollment process.    Smartphone: yes    [facility]: downloaded but unable to log in. Gave number to tech support    Program consent: not yet    Pulse oximeter status: yes    Verified emergency contacts: yes    Program Overview: Reviewed , no response process, and importance of correct emergency contacts in event that well-being check is warranted. Encouraged patient to call [phone] 24/7 to speak with an OnCall nurse, if needed.    Patient had no further questions.

## 2020-07-12 NOTE — PLAN OF CARE
07/12/20 1320   Final Note   Assessment Type Final Discharge Note   Anticipated Discharge Disposition Home   Right Care Referral Info   Post Acute Recommendation No Care

## 2020-07-12 NOTE — PLAN OF CARE
Selvin delivered pulse ox to nurse for patient. Selvin obtained from [facility]. Mr Langston gave approval.

## 2020-07-13 ENCOUNTER — TELEPHONE (OUTPATIENT)
Dept: INTERNAL MEDICINE | Facility: CLINIC | Age: 50
End: 2020-07-13

## 2020-07-13 ENCOUNTER — NURSE TRIAGE (OUTPATIENT)
Dept: ADMINISTRATIVE | Facility: CLINIC | Age: 50
End: 2020-07-13

## 2020-07-13 ENCOUNTER — PATIENT OUTREACH (OUTPATIENT)
Dept: ADMINISTRATIVE | Facility: CLINIC | Age: 50
End: 2020-07-13

## 2020-07-13 LAB
1,25(OH)2D3 SERPL-MCNC: 67 PG/ML (ref 20–79)
BACTERIA BLD CULT: NORMAL
BACTERIA BLD CULT: NORMAL

## 2020-07-13 NOTE — PATIENT INSTRUCTIONS
Treating Pneumonia  Pneumonia is an infection of one or both of the lungs. Pneumonia:  · Is usually caused by either a virus or a bacteria  · Can be very serious, especially in infants, young children, and older adults. Its also serious for those with other long-term health problems or weakened immune systems.  · Is sometimes treated at home and sometimes in the hospital    Antibiotic medicines  Antibiotics may be prescribed for pneumonia caused by bacteria. They may be pills (oral medicines), or shots (injections). Or they may be given by IV (intravenously) into a vein. If you are taking oral medicines at home:  · Fill your prescription and start taking your medicine as soon as you can.  · You will likely start to feel better in a day or 2, but dont stop taking the antibiotic.  · Use a pill organizer to help you remember to take your medicine.  · Let your healthcare provider know if you have side effects.  · Take your medicine exactly as directed on the label. Talk to your provider or pharmacist if you have any questions.  Antiviral medicines  Antiviral medicine may be prescribed for pneumonia caused by a virus. For example, antiviral medicine may be prescribed for pneumonia caused by the flu virus. Antibiotics do not work against viruses. If you are taking antiviral medicine at home:  · Fill your prescription and start taking your medicine as soon as you can.  · Talk with your provider or pharmacist about possible side effects.  · Take the medicine exactly as instructed.  To relieve symptoms  There are many medicines that can help relieve symptoms of pneumonia. Some are prescription and some are over-the-counter.  Your healthcare provider may recommend:  · Acetaminophen or ibuprofen to lower your fever and to lessen headache or other pain  · Cough medicine to loosen mucus or to reduce coughing  Make sure you check with your healthcare provider or pharmacist before taking any over-the-counter  medicines.  Special treatments  If you are hospitalized for pneumonia, you may have other therapies, including:  · Inhaled medicines to help with breathing or chest congestion  · Supplemental oxygen to increase low oxygen levels  Drink fluids and eat healthy  You should eat healthy to help your body fight the infection. Drinking a lot of fluids helps to replace fluids lost from fever and to loosen mucus in your chest.  · Diet. Make healthy food choices, including fruits and vegetables, lean meats and other proteins, 100% whole grain and low- or no-fat dairy products.  · Fluids. Drink at least 6 to 8 tall glasses a day. Water and 100% fruit or vegetable juice are best.  Get plenty of rest and sleep  You may be more tired than usual for a while. It is important to get enough sleep at night. Its also important to rest during the day. Talk with your healthcare provider if coughing or other symptoms are interfering with your sleep.  Preventing the spread of germs  The best thing you can do to prevent spreading germs is to wash your hands often. You should:  · Rub your hand with soap and water for 20 to 30 seconds.  · Clean in between your fingers, the backs of your hands, and around your nails.  · Dry your hands on a separate towel or use paper towels.  You should also:  · Keep alcohol-based hand  nearby.  · Make sure you also clean surfaces that you touch. Use a product that kills all types of germs.  · Stay away from others until you are feeling better.  When to call your healthcare provider  Call your healthcare provider if you have any of the following:  · Symptoms get worse  · Fever continues  · Shortness of breath gets worse  · Increased mucus or mucus that is darker in color  · Coughing gets worse  · Lips or fingers are bluish in color  · Side effects from your medicine   Date Last Reviewed: 12/1/2016  © 5908-9759 [employer]. [address] [place] [address], [place] [address]. All rights reserved.  This information is not intended as a substitute for professional medical care. Always follow your healthcare professional's instructions.

## 2020-07-13 NOTE — TELEPHONE ENCOUNTER
Reason for Disposition   MILD difficulty breathing (e.g., minimal/no SOB at rest, SOB with walking, pulse <100)    Additional Information   Negative: SEVERE difficulty breathing (e.g., struggling for each breath, speaks in single words)   Negative: Difficult to awaken or acting confused (e.g., disoriented, slurred speech)   Negative: Bluish (or gray) lips or face now   Negative: Shock suspected (e.g., cold/pale/clammy skin, too weak to stand, low BP, rapid pulse)   Negative: Sounds like a life-threatening emergency to the triager   Negative: [1] COVID-19 exposure AND [2] NO symptoms   Negative: COVID-19 and Breastfeeding, questions about   Negative: [1] Adult with possible COVID-19 symptoms AND [2] triager concerned about severity of symptoms or other causes   Negative: SEVERE or constant chest pain or pressure (Exception: mild central chest pain, present only when coughing)   Negative: MODERATE difficulty breathing (e.g., speaks in phrases, SOB even at rest, pulse 100-120)   Negative: Patient sounds very sick or weak to the triager    Protocols used: CORONAVIRUS (COVID-19) DIAGNOSED OR [id number]    Pt escalated at 3pm due to pt saying SOB #3 with activity. I called the pt and she said that she is fine unless when she is trying to do activities she is on 2 l NC will sent message to [facility] on call

## 2020-07-13 NOTE — TELEPHONE ENCOUNTER
COVID Surveillance Program: Phone Note    Called patient due to RN escalation in COVID Surveillance program. Pt escalated due to SOB with activity = 3. Positive Covid screen 7/7/20. Normal CXR on 7/9/20.     Vitals: Sp02 : 98%. P: 71. Temp: 98.3    50 y.o.F with pertinent PMHx intermittent asthma and OI on day 6 of Covid symptoms. She was admitted on 7/9/20 x 5 days for COVID, elevated troponin noted on admission that has resolved.     During phone call, pt reports feeling better than yesterday. She is on 2L 02 at home. No SOB at rest. Mild SOB with activity - she reports SOB has only occurred with heat.    Vitals are reassuring. Will continue to monitor. Pt will seek care overnight if symptoms worsen. Otherwise, will continue in Surveillance as planned.

## 2020-07-14 LAB
BACTERIA BLD CULT: NORMAL
BACTERIA BLD CULT: NORMAL

## 2020-07-18 ENCOUNTER — NURSE TRIAGE (OUTPATIENT)
Dept: ADMINISTRATIVE | Facility: CLINIC | Age: 50
End: 2020-07-18

## 2020-07-18 NOTE — TELEPHONE ENCOUNTER
"COVID SURVEILLANCE    SpO2 98  T 98.7    Pt escalated due to no response to 3 pm push notification. Pt entered VS at 347. All VS stable as listed above. Pt denies worsening cough, fever symptoms, and rates SOB at rest a "0" and with activity a "2" which would not trigger escalation. No contact made at this time. Will continue to monitor at next push notification.      Reason for Disposition   Caller has cancelled the call before the first contact    Protocols used: NO CONTACT OR DUPLICATE CONTACT CALL-A-AH      "

## 2020-07-18 NOTE — TELEPHONE ENCOUNTER
Patient initially escalated due to no response, however patient entered vitals prior to phone call. Vital signs and symptoms did not trigger a second escalation; therefore, no follow up call is needed at this time.    Reason for Disposition   Caller has cancelled the call before the first contact    Protocols used: NO CONTACT OR DUPLICATE CONTACT CALL-A-AH

## 2020-07-20 ENCOUNTER — NURSE TRIAGE (OUTPATIENT)
Dept: ADMINISTRATIVE | Facility: CLINIC | Age: 50
End: 2020-07-20

## 2020-07-20 NOTE — TELEPHONE ENCOUNTER
Covid Surveillance     Patient escalated due to late input of vs data WNL.   No call needed.  Will continue to monitor on next push notification.      Reason for Disposition   Health Information question, no triage required and triager able to answer question    Additional Information   Negative: [1] Caller is not with the adult (patient) AND [2] reporting urgent symptoms   Negative: Lab result questions   Negative: Medication questions   Negative: Caller can't be reached by phone   Negative: Caller has already spoken to PCP or another triager   Negative: RN needs further essential information from caller in order to complete triage   Negative: Requesting regular office appointment   Negative: [1] Caller requesting NON-URGENT health information AND [2] PCP's office is the best resource    Protocols used: INFORMATION ONLY CALL - NO TRIAGE-A-

## 2020-07-21 ENCOUNTER — NURSE TRIAGE (OUTPATIENT)
Dept: ADMINISTRATIVE | Facility: CLINIC | Age: 50
End: 2020-07-21

## 2020-07-21 NOTE — TELEPHONE ENCOUNTER
COVID SURVEILLANCE     PULSE O2   99  HEART RATE 92  SOB AT REST  0  SOB ON EXERTION 2   COUGH yes not worsening.     Called patient due to escalating vs to the covid abdulaziz. I tried all phone number and Emergency Contacts.  Sent text message.  Patient now has the [phone]        Reason for Disposition   No answer.  First attempt to contact caller.  Follow-up call scheduled within 15 minutes.   Second attempt to contact family AND no contact made.  Phone number verified.    Protocols used: NO CONTACT OR DUPLICATE CONTACT CALL-A-AH

## 2020-07-23 ENCOUNTER — NURSE TRIAGE (OUTPATIENT)
Dept: ADMINISTRATIVE | Facility: CLINIC | Age: 50
End: 2020-07-23

## 2020-07-23 NOTE — TELEPHONE ENCOUNTER
"COVID SURVEILLANCE  HR 82  SpO2 98  T 98.6    Pt escalated due to no response to 3 pm push notification. Pt entered VS at 327 pm. All VS stable as listed above. Pt denies worsening cough, fever symptoms, and rates SOB at rest a "0" and with activity a "2" which would not trigger escalation. No contact made at this time. Will continue to monitor at next push notification.      Reason for Disposition   Caller has cancelled the call before the first contact    Protocols used: NO CONTACT OR DUPLICATE CONTACT CALL-A-OH      "

## 2020-07-24 ENCOUNTER — NURSE TRIAGE (OUTPATIENT)
Dept: ADMINISTRATIVE | Facility: CLINIC | Age: 50
End: 2020-07-24

## 2020-07-24 NOTE — TELEPHONE ENCOUNTER
Covid Home Surveillance   Vitals O2  98 HR 84 Temp 98.1    Fever Symptomns: No  Cough: Yes, not worsening  SOB at rest: 0  SOB with activity: 2    Pt escalated through the Covid Home Surveillance program d/t no response at 1500 push.  Pt entered her v/s at 1520.  All VSS.  Pt denies fever, worsening cough, CP or difficulty breathing at this time.  No contact made with this pt.  Per protocol, no additional action needed at this time.  Will continue to monitor at next push notification.      Additional Information   Negative: Caller has already spoken with the PCP (or office), and has no further questions   Negative: Caller has already spoken with another triager and has no further questions   Negative: Caller has already spoken with another triager or PCP (or office), and has further questions and triager able to answer questions.   Negative: Busy signal.  First attempt to contact caller.  Follow-up call scheduled within 15 minutes.   Negative: No answer.  First attempt to contact caller.  Follow-up call scheduled within 15 minutes.   Negative: Message left on identified voicemail   Negative: Message left on unidentified voice mail. Phone number verified.   Negative: Message left with person in household   Negative: Wrong number reached. Phone number verified.   Negative: Second attempt to contact family AND no contact made. Phone number verified.   Negative: Cell phone out of range. Phone number verified.   Negative: Patient already left for the hospital/clinic   Negative: Caller has cancelled the call before the first contact   Negative: Unable to complete triage due to phone connection issues    Protocols used: NO CONTACT OR DUPLICATE CONTACT CALL-A-[place]

## 2020-07-25 ENCOUNTER — NURSE TRIAGE (OUTPATIENT)
Dept: ADMINISTRATIVE | Facility: CLINIC | Age: 50
End: 2020-07-25

## 2020-07-25 NOTE — TELEPHONE ENCOUNTER
Covid Surveillance    Pt escalated due to no response to 3 pm push. Attempted to reach pt at both numbers listed. Unable to leave voicemail. [employer] message sent. Will continue to monitor at next push notification.    Reason for Disposition   Second attempt to contact family AND no contact made.  Phone number verified.    Protocols used: NO CONTACT OR DUPLICATE CONTACT CALL-A-AH

## 2020-10-12 PROBLEM — J12.9 VIRAL PNEUMONIA, UNSPECIFIED: Status: RESOLVED | Noted: 2020-07-09 | Resolved: 2020-10-12

## 2021-01-05 ENCOUNTER — HOSPITAL ENCOUNTER (EMERGENCY)
Facility: HOSPITAL | Age: 51
Discharge: HOME | End: 2021-01-06
Attending: EMERGENCY MEDICINE
Payer: MEDICAID

## 2021-01-05 VITALS
SYSTOLIC BLOOD PRESSURE: 153 MMHG | HEART RATE: 80 BPM | OXYGEN SATURATION: 97 % | BODY MASS INDEX: 35.2 KG/M2 | RESPIRATION RATE: 16 BRPM | WEIGHT: 152.13 LBS | DIASTOLIC BLOOD PRESSURE: 89 MMHG | TEMPERATURE: 98 F | HEIGHT: 55 IN

## 2021-01-05 DIAGNOSIS — S61.211A LACERATION OF LEFT INDEX FINGER WITHOUT FOREIGN BODY WITHOUT DAMAGE TO NAIL, INITIAL ENCOUNTER: Primary | ICD-10-CM

## 2021-01-05 DIAGNOSIS — Z23 TETANUS-DIPHTHERIA VACCINATION ADMINISTERED AT CURRENT VISIT: ICD-10-CM

## 2021-01-05 PROCEDURE — 90471 IMMUNIZATION ADMIN: CPT | Performed by: NURSE PRACTITIONER

## 2021-01-05 PROCEDURE — 63600175 PHARM REV CODE 636 W HCPCS: Performed by: NURSE PRACTITIONER

## 2021-01-05 PROCEDURE — 90715 TDAP VACCINE 7 YRS/> IM: CPT | Performed by: NURSE PRACTITIONER

## 2021-01-05 PROCEDURE — 25000003 PHARM REV CODE 250: Performed by: NURSE PRACTITIONER

## 2021-01-05 PROCEDURE — 99284 EMERGENCY DEPT VISIT MOD MDM: CPT | Mod: 25

## 2021-01-05 RX ADMIN — Medication 1 ML: at 11:01

## 2021-01-05 RX ADMIN — CLOSTRIDIUM TETANI TOXOID ANTIGEN (FORMALDEHYDE INACTIVATED), CORYNEBACTERIUM DIPHTHERIAE TOXOID ANTIGEN (FORMALDEHYDE INACTIVATED), BORDETELLA PERTUSSIS TOXOID ANTIGEN (GLUTARALDEHYDE INACTIVATED), BORDETELLA PERTUSSIS FILAMENTOUS HEMAGGLUTININ ANTIGEN (FORMALDEHYDE INACTIVATED), BORDETELLA PERTUSSIS PERTACTIN ANTIGEN, AND BORDETELLA PERTUSSIS FIMBRIAE 2/3 ANTIGEN 0.5 ML: 5; 2; 2.5; 5; 3; 5 INJECTION, SUSPENSION INTRAMUSCULAR at 11:01

## 2021-01-06 PROCEDURE — 25000003 PHARM REV CODE 250: Performed by: NURSE PRACTITIONER

## 2021-01-06 PROCEDURE — 12001 RPR S/N/AX/GEN/TRNK 2.5CM/<: CPT

## 2021-01-06 RX ORDER — DOXYCYCLINE 100 MG/1
100 CAPSULE ORAL 2 TIMES DAILY
Qty: 14 CAPSULE | Refills: 0 | Status: SHIPPED | OUTPATIENT
Start: 2021-01-06 | End: 2021-01-13

## 2021-01-06 RX ORDER — DOXYCYCLINE HYCLATE 100 MG
100 TABLET ORAL
Status: COMPLETED | OUTPATIENT
Start: 2021-01-06 | End: 2021-01-06

## 2021-01-06 RX ADMIN — DOXYCYCLINE HYCLATE 100 MG: 100 TABLET, COATED ORAL at 12:01

## 2021-05-10 ENCOUNTER — PATIENT MESSAGE (OUTPATIENT)
Dept: RESEARCH | Facility: HOSPITAL | Age: 51
End: 2021-05-10

## 2021-07-29 VITALS
BODY MASS INDEX: 35.48 KG/M2 | DIASTOLIC BLOOD PRESSURE: 63 MMHG | HEART RATE: 92 BPM | OXYGEN SATURATION: 96 % | TEMPERATURE: 99 F | WEIGHT: 153.31 LBS | RESPIRATION RATE: 18 BRPM | HEIGHT: 55 IN | SYSTOLIC BLOOD PRESSURE: 140 MMHG

## 2021-07-29 PROCEDURE — 93010 EKG 12-LEAD: ICD-10-PCS | Mod: ,,, | Performed by: INTERNAL MEDICINE

## 2021-07-29 PROCEDURE — 93010 ELECTROCARDIOGRAM REPORT: CPT | Mod: ,,, | Performed by: INTERNAL MEDICINE

## 2021-07-29 PROCEDURE — 93005 ELECTROCARDIOGRAM TRACING: CPT

## 2021-07-29 PROCEDURE — 99900041 HC LEFT WITHOUT BEING SEEN- EMERGENCY

## 2021-07-29 RX ORDER — MECLIZINE HYDROCHLORIDE 25 MG/1
25 TABLET ORAL
Status: DISCONTINUED | OUTPATIENT
Start: 2021-07-29 | End: 2021-07-30 | Stop reason: HOSPADM

## 2021-07-30 ENCOUNTER — HOSPITAL ENCOUNTER (EMERGENCY)
Facility: HOSPITAL | Age: 51
Discharge: LEFT BEFORE DISCHARGE | End: 2021-07-30
Payer: MEDICAID

## 2021-07-30 DIAGNOSIS — R05.9 COUGH: ICD-10-CM

## 2021-07-30 DIAGNOSIS — R42 DIZZINESS: ICD-10-CM

## 2023-03-07 ENCOUNTER — HOSPITAL ENCOUNTER (EMERGENCY)
Facility: HOSPITAL | Age: 53
Discharge: HOME | End: 2023-03-07
Attending: EMERGENCY MEDICINE
Payer: MEDICAID

## 2023-03-07 VITALS
WEIGHT: 130.75 LBS | SYSTOLIC BLOOD PRESSURE: 121 MMHG | DIASTOLIC BLOOD PRESSURE: 58 MMHG | OXYGEN SATURATION: 99 % | HEIGHT: 55 IN | RESPIRATION RATE: 16 BRPM | HEART RATE: 87 BPM | TEMPERATURE: 98 F | BODY MASS INDEX: 30.26 KG/M2

## 2023-03-07 DIAGNOSIS — L02.91 ABSCESS: Primary | ICD-10-CM

## 2023-03-07 DIAGNOSIS — J34.0 NASAL ABSCESS: ICD-10-CM

## 2023-03-07 PROCEDURE — 96372 THER/PROPH/DIAG INJ SC/IM: CPT | Performed by: EMERGENCY MEDICINE

## 2023-03-07 PROCEDURE — 25000003 PHARM REV CODE 250: Performed by: EMERGENCY MEDICINE

## 2023-03-07 PROCEDURE — 99284 EMERGENCY DEPT VISIT MOD MDM: CPT | Mod: 25

## 2023-03-07 PROCEDURE — 63600175 PHARM REV CODE 636 W HCPCS: Performed by: EMERGENCY MEDICINE

## 2023-03-07 PROCEDURE — 10060 I&D ABSCESS SIMPLE/SINGLE: CPT

## 2023-03-07 RX ORDER — IBUPROFEN 600 MG/1
600 TABLET ORAL EVERY 6 HOURS PRN
Qty: 20 TABLET | Refills: 0 | Status: SHIPPED | OUTPATIENT
Start: 2023-03-07

## 2023-03-07 RX ORDER — CLINDAMYCIN HYDROCHLORIDE 150 MG/1
300 CAPSULE ORAL
Status: COMPLETED | OUTPATIENT
Start: 2023-03-07 | End: 2023-03-07

## 2023-03-07 RX ORDER — ONDANSETRON 4 MG/1
4 TABLET, ORALLY DISINTEGRATING ORAL
Status: COMPLETED | OUTPATIENT
Start: 2023-03-07 | End: 2023-03-07

## 2023-03-07 RX ORDER — IBUPROFEN 600 MG/1
600 TABLET ORAL
Status: COMPLETED | OUTPATIENT
Start: 2023-03-07 | End: 2023-03-07

## 2023-03-07 RX ORDER — CLINDAMYCIN HYDROCHLORIDE 300 MG/1
300 CAPSULE ORAL EVERY 6 HOURS
Qty: 28 CAPSULE | Refills: 0 | Status: SHIPPED | OUTPATIENT
Start: 2023-03-07 | End: 2023-03-14

## 2023-03-07 RX ORDER — HYDROCODONE BITARTRATE AND ACETAMINOPHEN 5; 325 MG/1; MG/1
1 TABLET ORAL
Status: COMPLETED | OUTPATIENT
Start: 2023-03-07 | End: 2023-03-07

## 2023-03-07 RX ORDER — DEXAMETHASONE SODIUM PHOSPHATE 4 MG/ML
4 INJECTION, SOLUTION INTRA-ARTICULAR; INTRALESIONAL; INTRAMUSCULAR; INTRAVENOUS; SOFT TISSUE
Status: COMPLETED | OUTPATIENT
Start: 2023-03-07 | End: 2023-03-07

## 2023-03-07 RX ADMIN — IBUPROFEN 600 MG: 600 TABLET, FILM COATED ORAL at 05:03

## 2023-03-07 RX ADMIN — HYDROCODONE BITARTRATE AND ACETAMINOPHEN 1 TABLET: 5; 325 TABLET ORAL at 05:03

## 2023-03-07 RX ADMIN — DEXAMETHASONE SODIUM PHOSPHATE 4 MG: 4 INJECTION INTRA-ARTICULAR; INTRALESIONAL; INTRAMUSCULAR; INTRAVENOUS; SOFT TISSUE at 05:03

## 2023-03-07 RX ADMIN — ONDANSETRON 4 MG: 4 TABLET, ORALLY DISINTEGRATING ORAL at 05:03

## 2023-03-07 RX ADMIN — CLINDAMYCIN HYDROCHLORIDE 300 MG: 150 CAPSULE ORAL at 05:03

## 2023-03-07 NOTE — DISCHARGE INSTRUCTIONS
Return for any fever, worsening swelling, swelling to your face, worsening headache eye pain or for any concerns or complications at all

## 2023-03-07 NOTE — ED PROVIDER NOTES
"SCRIBE #1 NOTE: I, Tess Denis, am scribing for, and in the presence of, Dean Horan Do, MD. I have scribed the entire note.       History     Chief Complaint   Patient presents with    Abscess     Pt has abscess to left nostril causing swelling. Pt also reports migraines and congestion     Review of patient's allergies indicates:   Allergen Reactions    Demerol [meperidine]      "I'm just sensitive to it."         History of Present Illness     HPI    3/7/2023, 4:31 AM  History obtained from the patient      History of Present Illness: Penny Pereira is a 53 y.o. female patient with a PMHx of asthma, ICH, and osteogenesis imperfecta who presents to the Emergency Department for evaluation of an abscess which onset gradually 3 days ago. Pt c/o an abscess to her L nostril and she reports that the swelling and pain has worsened. Pt notes that it has some clear drainage. Symptoms are constant and moderate in severity. No mitigating or exacerbating factors reported. Associated sxs include a HA. Patient denies any fever, chills, nausea, vomiting, and all other sxs at this time. No further complaints or concerns at this time.       Arrival mode: Personal vehicle    PCP: Rob Price MD        Past Medical History:  Past Medical History:   Diagnosis Date    Asthma     Depression     ICH (intracerebral hemorrhage)     Migraine headache     Osteogenesis imperfecta        Past Surgical History:  Past Surgical History:   Procedure Laterality Date    BRAIN SURGERY       SECTION      x 3    HYSTERECTOMY      LEG SURGERY Bilateral          Family History:  Family History   Problem Relation Age of Onset    Ovarian cancer Mother     No Known Problems Father     No Known Problems Sister     No Known Problems Sister        Social History:  Social History     Tobacco Use    Smoking status: Never    Smokeless tobacco: Never   Substance and Sexual Activity    Alcohol use: No    Drug use: No    Sexual activity: Yes     " Partners: Male        Review of Systems     Review of Systems   Constitutional:  Negative for chills and fever.   HENT:  Positive for facial swelling (Nose secondary to abscess). Negative for sore throat.    Respiratory:  Negative for shortness of breath.    Cardiovascular:  Negative for chest pain.   Gastrointestinal:  Negative for nausea and vomiting.   Genitourinary:  Negative for dysuria.   Musculoskeletal:  Negative for back pain.   Skin:  Positive for wound (Abscess to L nostril). Negative for rash.   Neurological:  Negative for weakness.   Hematological:  Does not bruise/bleed easily.   All other systems reviewed and are negative.     Physical Exam     Initial Vitals [03/07/23 0317]   BP Pulse Resp Temp SpO2   (!) 121/58 88 18 98 °F (36.7 °C) 98 %      MAP       --          Physical Exam  Nursing Notes and Vital Signs Reviewed.  Constitutional: Patient is in no acute distress. Well-developed and well-nourished.  Head: Atraumatic. Normocephalic.  Eyes: PERRL. EOM intact. Conjunctivae are not pale. No scleral icterus.  ENT: Mucous membranes are moist. Oropharynx is clear and symmetric.    Entire nose red and cellulitic. there is a small area on the inside of the left nare that has been draining. I do not see a pus head. No swelling past nose. No periorbital swelling. No maxillary swelling or tenderness of the sinuses.          Neck: Supple. Full ROM. No lymphadenopathy.  Cardiovascular: Regular rate. Regular rhythm. No murmurs, rubs, or gallops. Distal pulses are 2+ and symmetric.  Pulmonary/Chest: No respiratory distress. Clear to auscultation bilaterally. No wheezing or rales.  Abdominal: Soft and non-distended.  There is no tenderness.  No rebound, guarding, or rigidity.   Musculoskeletal: Moves all extremities. No obvious deformities. No edema. No calf tenderness.  Skin: Warm and dry.  Neurological:  Alert, awake, and appropriate.  Normal speech.  No acute focal neurological deficits are  "appreciated.  Psychiatric: Normal affect. Good eye contact. Appropriate in content.     ED Course   I & D - Incision and Drainage    Date/Time: 3/7/2023 5:10 AM  Location procedure was performed: [facility] EMERGENCY DEPARTMENT  Performed by: Dean Horan Do, MD  Authorized by: Dean Horan Do, MD   Consent Done: Yes  Consent: Verbal consent obtained.  Risks and benefits: risks, benefits and alternatives were discussed  Consent given by: patient  Patient understanding: patient states understanding of the procedure being performed  Patient consent: the patient's understanding of the procedure matches consent given  Procedure consent: procedure consent matches procedure scheduled  Required items: required blood products, implants, devices, and special equipment available  Patient identity confirmed: , name and verbally with patient  Time out: Immediately prior to procedure a "time out" was called to verify the correct patient, procedure, equipment, support staff and site/side marked as required.  Type: abscess  Body area: head/neck  Location details: nose  Anesthesia: see MAR for details    Patient sedated: no  Scalpel size: 18 gauge needle.  Complexity: simple  Drainage: pus  Drainage amount: 1 drop.  Wound treatment: drainage and wound left open  Packing material: none  Complications: No  Specimens: No  Implants: No  Patient tolerance: Patient tolerated the procedure well with no immediate complications      ED Vital Signs:  Vitals:    23 0317 23 0501 23 0521   BP: (!) 121/58  (!) 121/58   Pulse: 88  87   Resp: 18 16 16   Temp: 98 °F (36.7 °C)  98.1 °F (36.7 °C)   TempSrc: Oral     SpO2: 98%  99%   Weight: 59.3 kg (130 lb 11.7 oz)     Height: 4' 6" (1.372 m)                The Emergency Provider reviewed the vital signs and test results, which are outlined above.     ED Discussion       5:16 AM: Reassessed pt at this time. Discussed with pt all pertinent ED information and results.  Note sent to " Dr. Murphy with ENT for him to follow her up in clinic.  patient discharged with ibuprofen for pain and clindamycin     Discussed pt dx and plan of tx. Gave pt all f/u and return to the ED instructions. All questions and concerns were addressed at this time. Pt expresses understanding of information and instructions, and is comfortable with plan to discharge. Pt is stable for discharge.    I discussed with patient and/or family/caretaker that evaluation in the ED does not suggest any emergent or life threatening medical conditions requiring immediate intervention beyond what was provided in the ED, and I believe patient is safe for discharge.  Regardless, an unremarkable evaluation in the ED does not preclude the development or presence of a serious of life threatening condition. As such, patient was instructed to return immediately for any worsening or change in current symptoms.                   ED Medication(s):  Medications   clindamycin capsule 300 mg (300 mg Oral Given 3/7/23 0501)   HYDROcodone-acetaminophen 5-325 mg per tablet 1 tablet (1 tablet Oral Given 3/7/23 0501)   ibuprofen tablet 600 mg (600 mg Oral Given 3/7/23 0501)   ondansetron disintegrating tablet 4 mg (4 mg Oral Given 3/7/23 0501)   dexAMETHasone injection 4 mg (4 mg Intramuscular Given 3/7/23 0504)       Discharge Medication List as of 3/7/2023  5:19 AM        START taking these medications    Details   clindamycin (CLEOCIN) 300 MG capsule Take 1 capsule (300 mg total) by mouth every 6 (six) hours. for 7 days, Starting Tue 3/7/2023, Until Tue 3/14/2023, Normal      ibuprofen (ADVIL,MOTRIN) 600 MG tablet Take 1 tablet (600 mg total) by mouth every 6 (six) hours as needed for Pain., Starting Tue 3/7/2023, Normal              Follow-up Information       Rob Price MD In 2 days.    Specialty: Family Medicine  Contact information:  [address]  [address]  [facility] [id number] [phone]               Jericho Moya MD.     Specialty: Otolaryngology  Why: Referral has been done for you, clinic will be calling you for an appointment  Contact information:  [address] [facility]  [place] [address]  [phone]                                 Scribe Attestation:   Scribe #1: I performed the above scribed service and the documentation accurately describes the services I performed. I attest to the accuracy of the note.     Attending:   Physician Attestation Statement for Scribe #1: I, Dean Horan Do, MD, personally performed the services described in this documentation, as scribed by Tess Denis, in my presence, and it is both accurate and complete.           Clinical Impression       ICD-10-CM ICD-9-CM   1. Abscess  L02.91 682.9   2. Nasal abscess  J34.0 478.19       Disposition:   Disposition: Discharged  Condition: Stable       Dean Horan Do, MD  03/07/23 0538

## 2023-03-09 ENCOUNTER — TELEPHONE (OUTPATIENT)
Dept: OTOLARYNGOLOGY | Facility: CLINIC | Age: 53
End: 2023-03-09
Payer: MEDICAID

## 2023-03-09 NOTE — TELEPHONE ENCOUNTER
Pt states she went to [facility] and it was drained and she was given an antibiotic injection, now doing much better States she has appt with her MD on Monday.  Will call our office if she feels she wants to be scheduled.

## 2023-03-09 NOTE — TELEPHONE ENCOUNTER
----- Message from Jericho Moya MD sent at 3/9/2023  4:20 PM CST -----  Regarding: ED f/u  This patient was seen in the ED a couple days ago for a small nasal abscess. Please see if we can get her an appt for tomorrow. If she feels like its getting a lot better, can be delayed until Monday.

## 2024-04-15 ENCOUNTER — HOSPITAL ENCOUNTER (EMERGENCY)
Facility: HOSPITAL | Age: 54
Discharge: HOME | End: 2024-04-16
Attending: EMERGENCY MEDICINE
Payer: MEDICAID

## 2024-04-15 DIAGNOSIS — K29.70 GASTRITIS, PRESENCE OF BLEEDING UNSPECIFIED, UNSPECIFIED CHRONICITY, UNSPECIFIED GASTRITIS TYPE: Primary | ICD-10-CM

## 2024-04-15 DIAGNOSIS — R07.9 CHEST PAIN: ICD-10-CM

## 2024-04-15 LAB
ALBUMIN SERPL BCP-MCNC: 3.9 G/DL (ref 3.5–5.2)
ALP SERPL-CCNC: 156 U/L (ref 55–135)
ALT SERPL W/O P-5'-P-CCNC: 21 U/L (ref 10–44)
ANION GAP SERPL CALC-SCNC: 10 MMOL/L (ref 8–16)
AST SERPL-CCNC: 15 U/L (ref 10–40)
BASOPHILS # BLD AUTO: 0.06 K/UL (ref 0–0.2)
BASOPHILS NFR BLD: 0.5 % (ref 0–1.9)
BILIRUB SERPL-MCNC: 0.4 MG/DL (ref 0.1–1)
BNP SERPL-MCNC: <10 PG/ML (ref 0–99)
BUN SERPL-MCNC: 12 MG/DL (ref 6–20)
CALCIUM SERPL-MCNC: 9.8 MG/DL (ref 8.7–10.5)
CHLORIDE SERPL-SCNC: 102 MMOL/L (ref 95–110)
CO2 SERPL-SCNC: 29 MMOL/L (ref 23–29)
CREAT SERPL-MCNC: 0.7 MG/DL (ref 0.5–1.4)
DIFFERENTIAL METHOD BLD: ABNORMAL
EOSINOPHIL # BLD AUTO: 0.3 K/UL (ref 0–0.5)
EOSINOPHIL NFR BLD: 2 % (ref 0–8)
ERYTHROCYTE [DISTWIDTH] IN BLOOD BY AUTOMATED COUNT: 14 % (ref 11.5–14.5)
EST. GFR  (NO RACE VARIABLE): >60 ML/MIN/1.73 M^2
GLUCOSE SERPL-MCNC: 119 MG/DL (ref 70–110)
HCT VFR BLD AUTO: 45.6 % (ref 37–48.5)
HGB BLD-MCNC: 15.1 G/DL (ref 12–16)
IMM GRANULOCYTES # BLD AUTO: 0.04 K/UL (ref 0–0.04)
IMM GRANULOCYTES NFR BLD AUTO: 0.3 % (ref 0–0.5)
LYMPHOCYTES # BLD AUTO: 3.9 K/UL (ref 1–4.8)
LYMPHOCYTES NFR BLD: 31.7 % (ref 18–48)
MCH RBC QN AUTO: 27.3 PG (ref 27–31)
MCHC RBC AUTO-ENTMCNC: 33.1 G/DL (ref 32–36)
MCV RBC AUTO: 83 FL (ref 82–98)
MONOCYTES # BLD AUTO: 0.8 K/UL (ref 0.3–1)
MONOCYTES NFR BLD: 6.1 % (ref 4–15)
NEUTROPHILS # BLD AUTO: 7.4 K/UL (ref 1.8–7.7)
NEUTROPHILS NFR BLD: 59.4 % (ref 38–73)
NRBC BLD-RTO: 0 /100 WBC
PLATELET # BLD AUTO: 276 K/UL (ref 150–450)
PMV BLD AUTO: 9.3 FL (ref 9.2–12.9)
POTASSIUM SERPL-SCNC: 4.2 MMOL/L (ref 3.5–5.1)
PROT SERPL-MCNC: 7.7 G/DL (ref 6–8.4)
RBC # BLD AUTO: 5.53 M/UL (ref 4–5.4)
SODIUM SERPL-SCNC: 141 MMOL/L (ref 136–145)
TROPONIN I SERPL DL<=0.01 NG/ML-MCNC: <0.006 NG/ML (ref 0–0.03)
WBC # BLD AUTO: 12.4 K/UL (ref 3.9–12.7)

## 2024-04-15 PROCEDURE — 99285 EMERGENCY DEPT VISIT HI MDM: CPT | Mod: 25

## 2024-04-15 PROCEDURE — 93005 ELECTROCARDIOGRAM TRACING: CPT

## 2024-04-15 PROCEDURE — 84484 ASSAY OF TROPONIN QUANT: CPT | Performed by: NURSE PRACTITIONER

## 2024-04-15 PROCEDURE — 85025 COMPLETE CBC W/AUTO DIFF WBC: CPT | Performed by: NURSE PRACTITIONER

## 2024-04-15 PROCEDURE — 93010 ELECTROCARDIOGRAM REPORT: CPT | Mod: ,,, | Performed by: INTERNAL MEDICINE

## 2024-04-15 PROCEDURE — 96375 TX/PRO/DX INJ NEW DRUG ADDON: CPT

## 2024-04-15 PROCEDURE — 80053 COMPREHEN METABOLIC PANEL: CPT | Performed by: NURSE PRACTITIONER

## 2024-04-15 PROCEDURE — 96361 HYDRATE IV INFUSION ADD-ON: CPT

## 2024-04-15 PROCEDURE — 96374 THER/PROPH/DIAG INJ IV PUSH: CPT

## 2024-04-15 PROCEDURE — 83880 ASSAY OF NATRIURETIC PEPTIDE: CPT | Performed by: NURSE PRACTITIONER

## 2024-04-15 NOTE — FIRST PROVIDER EVALUATION
Medical screening examination initiated.  I have conducted a focused provider triage encounter, findings are as follows:    Brief history of present illness:  34-year-old female presents to emergency room with chest pain that radiates to her back for 2 days    Vitals:    04/15/24 1810   Pulse: 92   Resp: 16   Temp: 98.1 °F (36.7 °C)   TempSrc: Oral   SpO2: 95%   Weight: 61.2 kg (135 lb)       Pertinent physical exam:  Vital signs stable no acute distress    Brief workup plan:  ACS    Preliminary workup initiated; this workup will be continued and followed by the physician or advanced practice provider that is assigned to the patient when roomed.

## 2024-04-16 VITALS
DIASTOLIC BLOOD PRESSURE: 70 MMHG | SYSTOLIC BLOOD PRESSURE: 140 MMHG | RESPIRATION RATE: 18 BRPM | BODY MASS INDEX: 32.55 KG/M2 | TEMPERATURE: 98 F | WEIGHT: 135 LBS | HEART RATE: 80 BPM | OXYGEN SATURATION: 100 %

## 2024-04-16 PROBLEM — R07.9 CHEST PAIN: Status: ACTIVE | Noted: 2024-04-16

## 2024-04-16 PROBLEM — K29.70 GASTRITIS: Status: ACTIVE | Noted: 2024-04-16

## 2024-04-16 LAB — LIPASE SERPL-CCNC: 28 U/L (ref 4–60)

## 2024-04-16 PROCEDURE — 63600175 PHARM REV CODE 636 W HCPCS: Performed by: EMERGENCY MEDICINE

## 2024-04-16 PROCEDURE — 83690 ASSAY OF LIPASE: CPT | Performed by: EMERGENCY MEDICINE

## 2024-04-16 PROCEDURE — 25500020 PHARM REV CODE 255: Performed by: EMERGENCY MEDICINE

## 2024-04-16 PROCEDURE — 25000003 PHARM REV CODE 250: Performed by: EMERGENCY MEDICINE

## 2024-04-16 RX ORDER — LIDOCAINE HYDROCHLORIDE 20 MG/ML
15 SOLUTION OROPHARYNGEAL ONCE
Status: DISCONTINUED | OUTPATIENT
Start: 2024-04-16 | End: 2024-04-16

## 2024-04-16 RX ORDER — KETOROLAC TROMETHAMINE 30 MG/ML
30 INJECTION, SOLUTION INTRAMUSCULAR; INTRAVENOUS
Status: COMPLETED | OUTPATIENT
Start: 2024-04-16 | End: 2024-04-16

## 2024-04-16 RX ORDER — LIDOCAINE HYDROCHLORIDE 20 MG/ML
15 SOLUTION OROPHARYNGEAL ONCE
Status: COMPLETED | OUTPATIENT
Start: 2024-04-16 | End: 2024-04-16

## 2024-04-16 RX ORDER — ONDANSETRON HYDROCHLORIDE 2 MG/ML
4 INJECTION, SOLUTION INTRAVENOUS
Status: COMPLETED | OUTPATIENT
Start: 2024-04-16 | End: 2024-04-16

## 2024-04-16 RX ORDER — PANTOPRAZOLE SODIUM 20 MG/1
20 TABLET, DELAYED RELEASE ORAL DAILY
Qty: 30 TABLET | Refills: 0 | Status: SHIPPED | OUTPATIENT
Start: 2024-04-16 | End: 2025-04-16

## 2024-04-16 RX ORDER — ALUMINUM HYDROXIDE, MAGNESIUM HYDROXIDE, AND SIMETHICONE 1200; 120; 1200 MG/30ML; MG/30ML; MG/30ML
30 SUSPENSION ORAL ONCE
Status: COMPLETED | OUTPATIENT
Start: 2024-04-16 | End: 2024-04-16

## 2024-04-16 RX ORDER — ALUMINUM HYDROXIDE, MAGNESIUM HYDROXIDE, AND SIMETHICONE 1200; 120; 1200 MG/30ML; MG/30ML; MG/30ML
30 SUSPENSION ORAL ONCE
Status: DISCONTINUED | OUTPATIENT
Start: 2024-04-16 | End: 2024-04-16

## 2024-04-16 RX ADMIN — LIDOCAINE HYDROCHLORIDE 15 ML: 20 SOLUTION ORAL at 02:04

## 2024-04-16 RX ADMIN — SODIUM CHLORIDE 1000 ML: 9 INJECTION, SOLUTION INTRAVENOUS at 12:04

## 2024-04-16 RX ADMIN — ONDANSETRON 4 MG: 2 INJECTION INTRAMUSCULAR; INTRAVENOUS at 12:04

## 2024-04-16 RX ADMIN — IOHEXOL 100 ML: 350 INJECTION, SOLUTION INTRAVENOUS at 12:04

## 2024-04-16 RX ADMIN — KETOROLAC TROMETHAMINE 30 MG: 30 INJECTION, SOLUTION INTRAMUSCULAR at 12:04

## 2024-04-16 RX ADMIN — ALUMINUM HYDROXIDE, MAGNESIUM HYDROXIDE, AND SIMETHICONE 30 ML: 200; 200; 20 SUSPENSION ORAL at 02:04

## 2024-04-16 NOTE — DISCHARGE INSTRUCTIONS
Regarding GASTRITIS/GERD/INDIGESTION, I recommended that the patient: maintain a healthy weight; avoid lying down after meals; avoid eating late at night; elevate the head of the bed by 6 inches; avoid wearing tight-fitting clothes; avoid foods that irritate the stomach (alcohol, fat, chocolate, caffeine, and spearmint or peppermint); talk to primary care provider if taking any medications that can make GERD symptoms worse (calcium channel blockers, theophylline, and anticholinergic medications); begin an exercise program; stop-smoking if applicable; limit alcohol intake to no more than 2 drinks a day; take medications exactly as directed; and avoid over-the-counter nonsteroidal anti-inflammatory drugs, such as aspirin and ibuprofen. Instructed patient to contact primary care provider or return to the emergency department if any of the following signs or symptoms are present: trouble swallowing; pain when swallowing; feeling of food caught in chest or throat; pain in the neck, chest, or back; heartburn that causes emesis; hematemesis; black or tarry stools; increase in salivation; weight loss of more than 3% to 5% of total body weight in a month; hoarseness or sore throat that wont go away; and choking, coughing, or wheezing.

## 2024-04-16 NOTE — ED PROVIDER NOTES
"SCRIBE #1 NOTE: I, Keira Chavez, am scribing for, and in the presence of, Torito Maxwell Jr., MD. I have scribed the entire note.       History     Chief Complaint   Patient presents with    Chest Pain     Pt states she started having chest pain on Saturday and it has continued to today. Pt states it feels like a tightness that radiates to her back.      Review of patient's allergies indicates:   Allergen Reactions    Demerol [meperidine]      "I'm just sensitive to it."    Latex, natural rubber Rash         History of Present Illness     HPI    2024, 12:03 AM  History obtained from the patient      History of Present Illness: Penny Pereira is a 54 y.o. female patient with a PMHx of asthma, depression, intracerebral hemorrhage, migraines, and osteogenesis imperfecta who presents to the Emergency Department for evaluation of constant, moderate to severe epigastric abdominal pain that radiates to the back which onset 4 days PTA. The patient reports a Hx of GERD, but states that tonight's symptoms are worse compared to past episodes. She reports a SHx of hysterectomy. No mitigating or exacerbating factors reported. Associated sxs include nausea. Patient denies any vomiting, diarrhea, dysuria, hematuria, CP, and all other sxs at this time. No prior Tx reported. No further complaints or concerns at this time.       Arrival mode: Personal vehicle    PCP: Rob Price MD        Past Medical History:  Past Medical History:   Diagnosis Date    Asthma     Depression     ICH (intracerebral hemorrhage)     Migraine headache     Osteogenesis imperfecta        Past Surgical History:  Past Surgical History:   Procedure Laterality Date    BRAIN SURGERY       SECTION      x 3    HYSTERECTOMY      LEG SURGERY Bilateral          Family History:  Family History   Problem Relation Name Age of Onset    Ovarian cancer Mother      No Known Problems Father      No Known Problems Sister      No Known Problems Sister   "       Social History:  Social History     Tobacco Use    Smoking status: Never    Smokeless tobacco: Never   Substance and Sexual Activity    Alcohol use: No    Drug use: No    Sexual activity: Yes     Partners: Male        Review of Systems     Review of Systems   Constitutional:  Negative for fever.   HENT:  Negative for sore throat.    Respiratory:  Negative for shortness of breath.    Cardiovascular:  Negative for chest pain.   Gastrointestinal:  Positive for abdominal pain (epigastric) and nausea. Negative for diarrhea and vomiting.   Genitourinary:  Negative for dysuria and hematuria.   Musculoskeletal:  Positive for back pain.   Skin:  Negative for rash.   Neurological:  Negative for weakness.   Hematological:  Does not bruise/bleed easily.   All other systems reviewed and are negative.     Physical Exam     Initial Vitals   BP Pulse Resp Temp SpO2   04/16/24 0000 04/15/24 1810 04/15/24 1810 04/15/24 1810 04/15/24 1810   (!) 156/80 92 16 98.1 °F (36.7 °C) 95 %      MAP       --                 Physical Exam  Nursing Notes and Vital Signs Reviewed.  Constitutional: Patient is in no acute distress. Well-developed and well-nourished.  Head: Atraumatic. Normocephalic.  Eyes: PERRL. EOM intact. Conjunctivae are not pale. No scleral icterus.  ENT: Mucous membranes are moist. Oropharynx is clear and symmetric.    Neck: Supple. Full ROM. No lymphadenopathy.  Cardiovascular: Regular rate. Regular rhythm. No murmurs, rubs, or gallops. Distal pulses are 2+ and symmetric.  Pulmonary/Chest: No respiratory distress. Clear to auscultation bilaterally. No wheezing or rales.  Abdominal: Soft and non-distended.  There is epigastric tenderness.  No rebound, guarding, or rigidity. Good bowel sounds.  Genitourinary: No CVA tenderness  Musculoskeletal: Moves all extremities. No obvious deformities. No edema. No calf tenderness.  Skin: Warm and dry.  Neurological:  Alert, awake, and appropriate.  Normal speech.  No acute focal  neurological deficits are appreciated.  Psychiatric: Normal affect. Good eye contact. Appropriate in content.     ED Course   Procedures  ED Vital Signs:  Vitals:    04/15/24 1810 04/16/24 0000 04/16/24 0115 04/16/24 0205   BP:  (!) 156/80 (!) 117/58 137/63   Pulse: 92 89 77 80   Resp: 16 16 18 12   Temp: 98.1 °F (36.7 °C)      TempSrc: Oral      SpO2: 95% 97% 99% 98%   Weight: 61.2 kg (135 lb)       04/16/24 0306 04/16/24 0330   BP: 121/73 (!) 140/70   Pulse: 78 80   Resp: 18 18   Temp:     TempSrc:     SpO2: 98% 100%   Weight:         Abnormal Lab Results:  Labs Reviewed   CBC W/ AUTO DIFFERENTIAL - Abnormal; Notable for the following components:       Result Value    RBC 5.53 (*)     All other components within normal limits   COMPREHENSIVE METABOLIC PANEL - Abnormal; Notable for the following components:    Glucose 119 (*)     Alkaline Phosphatase 156 (*)     All other components within normal limits   TROPONIN I   B-TYPE NATRIURETIC PEPTIDE   LIPASE        All Lab Results:  Results for orders placed or performed during the hospital encounter of 04/15/24   CBC auto differential   Result Value Ref Range    WBC 12.40 3.90 - 12.70 K/uL    RBC 5.53 (H) 4.00 - 5.40 M/uL    Hemoglobin 15.1 12.0 - 16.0 g/dL    Hematocrit 45.6 37.0 - 48.5 %    MCV 83 82 - 98 fL    MCH 27.3 27.0 - 31.0 pg    MCHC 33.1 32.0 - 36.0 g/dL    RDW 14.0 11.5 - 14.5 %    Platelets 276 150 - 450 K/uL    MPV 9.3 9.2 - 12.9 fL    Immature Granulocytes 0.3 0.0 - 0.5 %    Gran # (ANC) 7.4 1.8 - 7.7 K/uL    Immature Grans (Abs) 0.04 0.00 - 0.04 K/uL    Lymph # 3.9 1.0 - 4.8 K/uL    Mono # 0.8 0.3 - 1.0 K/uL    Eos # 0.3 0.0 - 0.5 K/uL    Baso # 0.06 0.00 - 0.20 K/uL    nRBC 0 0 /100 WBC    Gran % 59.4 38.0 - 73.0 %    Lymph % 31.7 18.0 - 48.0 %    Mono % 6.1 4.0 - 15.0 %    Eosinophil % 2.0 0.0 - 8.0 %    Basophil % 0.5 0.0 - 1.9 %    Differential Method Automated    Comprehensive metabolic panel   Result Value Ref Range    Sodium 141 136 - 145 mmol/L     Potassium 4.2 3.5 - 5.1 mmol/L    Chloride 102 95 - 110 mmol/L    CO2 29 23 - 29 mmol/L    Glucose 119 (H) 70 - 110 mg/dL    BUN 12 6 - 20 mg/dL    Creatinine 0.7 0.5 - 1.4 mg/dL    Calcium 9.8 8.7 - 10.5 mg/dL    Total Protein 7.7 6.0 - 8.4 g/dL    Albumin 3.9 3.5 - 5.2 g/dL    Total Bilirubin 0.4 0.1 - 1.0 mg/dL    Alkaline Phosphatase 156 (H) 55 - 135 U/L    AST 15 10 - 40 U/L    ALT 21 10 - 44 U/L    eGFR >60 >60 mL/min/1.73 m^2    Anion Gap 10 8 - 16 mmol/L   Troponin I #1   Result Value Ref Range    Troponin I <0.006 0.000 - 0.026 ng/mL   BNP   Result Value Ref Range    BNP <10 0 - 99 pg/mL   Lipase   Result Value Ref Range    Lipase 28 4 - 60 U/L   EKG 12-lead   Result Value Ref Range    QRS Duration 72 ms    [facility] QTC Calculation 406 ms         Imaging Results:  Imaging Results              CTA Chest Abdomen Pelvis (Final result)  Result time 04/16/24 01:45:04   Procedure changed from CTA Chest Non-Coronary (PE Studies)     Final result by Buddy Campos MD (04/16/24 01:45:04)                   Impression:      No central or segmental pulmonary embolus.      Electronically signed by: Buddy Campos  Date:    04/16/2024  Time:    01:45               Narrative:    EXAMINATION:  CTA CHEST ABDOMEN PELVIS    CLINICAL HISTORY:  Pulmonary embolism (PE) suspected, high prob;    TECHNIQUE:  CTA chest abdomen pelvis after 100 cc intravenous contrast.  MIP, coronal and sagittal reformatted images obtained.    COMPARISON:  None.    FINDINGS:  Chest:    Base of Neck: No significant abnormality.    Thoracic soft tissues: Unremarkable.    Aorta: Left-sided aortic arch.  No aneurysm and no significant atherosclerosis    Heart: Normal size. No effusion.    Pulmonary vasculature: No central or segmental pulmonary embolus.    Liyah/Mediastinum: No pathologic sandi enlargement.    Airways: Patent.    Lungs/Pleura: Clear lungs. No pleural effusion or thickening.    Esophagus: Unremarkable.    Bones: Multiple chronic  right-sided rib fracture deformities.  Moderate chronic height loss of T1 vertebral body.    Abdomen/pelvis:    Fatty liver.  Unremarkable gallbladder.  Unremarkable spleen and pancreas.  Unremarkable adrenal glands.  Unremarkable kidneys and bladder.  No acute infectious or inflammatory process involving bowel.  Normal appendix.  Large fat attenuation lesion of the right hamstring musculature, partially imaged.  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                                       X-Ray Chest AP Portable (Final result)  Result time 04/15/24 19:20:04      Final result by Jaida Jasmine MD (04/15/24 19:20:04)                   Impression:      Stable chest exam      Electronically signed by: Jaida Jasmine  Date:    04/15/2024  Time:    19:20               Narrative:    EXAMINATION:  XR CHEST AP PORTABLE    CLINICAL HISTORY:  Chest Pain;    TECHNIQUE:  Single frontal portable view of the chest was performed.    COMPARISON:  July 2021    FINDINGS:  No pulmonary consolidation, pleural effusion or convincing pneumothorax                                       The EKG was ordered, reviewed, and independently interpreted by the ED provider.  Interpretation time: 18:06  Rate: 88 BPM  Rhythm: normal sinus rhythm  Interpretation: No acute ST changes. No STEMI.           The Emergency Provider reviewed the vital signs and test results, which are outlined above.     ED Discussion     3:31 AM: Reassessed pt at this time.  Pt states her condition has improved at this time. Discussed with pt all pertinent ED information and results. Discussed pt dx and plan of tx. Gave pt all f/u and return to the ED instructions. All questions and concerns were addressed at this time. Pt expresses understanding of information and instructions, and is comfortable with plan to discharge. Pt is stable for discharge.    I discussed with patient and/or family/caretaker that evaluation in  the ED does not suggest any emergent or life threatening medical conditions requiring immediate intervention beyond what was provided in the ED, and I believe patient is safe for discharge.  Regardless, an unremarkable evaluation in the ED does not preclude the development or presence of a serious of life threatening condition. As such, patient was instructed to return immediately for any worsening or change in current symptoms.    Regarding GASTRITIS/GERD/INDIGESTION, I recommended that the patient: maintain a healthy weight; avoid lying down after meals; avoid eating late at night; elevate the head of the bed by 6 inches; avoid wearing tight-fitting clothes; avoid foods that irritate the stomach (alcohol, fat, chocolate, caffeine, and spearmint or peppermint); talk to primary care provider if taking any medications that can make GERD symptoms worse (calcium channel blockers, theophylline, and anticholinergic medications); begin an exercise program; stop-smoking if applicable; limit alcohol intake to no more than 2 drinks a day; take medications exactly as directed; and avoid over-the-counter nonsteroidal anti-inflammatory drugs, such as aspirin and ibuprofen. Instructed patient to contact primary care provider or return to the emergency department if any of the following signs or symptoms are present: trouble swallowing; pain when swallowing; feeling of food caught in chest or throat; pain in the neck, chest, or back; heartburn that causes emesis; hematemesis; black or tarry stools; increase in salivation; weight loss of more than 3% to 5% of total body weight in a month; hoarseness or sore throat that wont go away; and choking, coughing, or wheezing.       Medical Decision Making  Amount and/or Complexity of Data Reviewed  Labs: ordered. Decision-making details documented in ED Course.  Radiology: ordered and independent interpretation performed. Decision-making details documented in ED Course.  ECG/medicine  tests: ordered and independent interpretation performed. Decision-making details documented in ED Course.    Risk  OTC drugs.  Prescription drug management.  Parenteral controlled substances.  Risk Details: OTC drugs, prescription drugs and controlled substances considered.  Due to patient's symptoms improving and pain controlled pain medications ordered appropriately.  DDX: MI, CAD, PUlmonary disease, PE, AAA, Pneumonia, Costochondritis, PTX, Liver disease                  ED Medication(s):  Medications   sodium chloride 0.9% bolus 1,000 mL 1,000 mL (0 mLs Intravenous Stopped 4/16/24 0123)   ondansetron injection 4 mg (4 mg Intravenous Given 4/16/24 0019)   ketorolac injection 30 mg (30 mg Intravenous Given 4/16/24 0020)   iohexoL (OMNIPAQUE 350) injection 100 mL (100 mLs Intravenous Given 4/16/24 0033)   aluminum-magnesium hydroxide-simethicone 200-200-20 mg/5 mL suspension 30 mL (30 mLs Oral Given 4/16/24 0257)     And   LIDOcaine viscous HCl 2% oral solution 15 mL (15 mLs Oral Given 4/16/24 0256)       Discharge Medication List as of 4/16/2024  3:32 AM           Follow-up Information       Rob Price MD. Schedule an appointment as soon as possible for a visit in 1 week.    Specialty: Family Medicine  Contact information:  [address] [facility] [address]  [facility] [address]  [phone]               [facility]. Schedule an appointment as soon as possible for a visit in 1 week.    Specialty: Gastroenterology  Contact information:  [address] [facility] [id number] [phone]  Additional information:  Please park on the [address] side and use the Clinic entrance. Check in on the 4th floor, to the left.             [facility] - Emergency Dept..    Specialty: Emergency Medicine  Why: As needed, If symptoms worsen  Contact information:  [address]  [place] [address] [phone]                               Scribe Attestation:    Scribe #1: I performed the above scribed service and the documentation accurately describes the services I performed. I attest to the accuracy of the note.     Attending:   Physician Attestation Statement for Scribe #1: I, Torito Maxwell Jr., MD, personally performed the services described in this documentation, as scribed by Keira Chavez, in my presence, and it is both accurate and complete.           Clinical Impression       ICD-10-CM ICD-9-CM   1. Gastritis, presence of bleeding unspecified, unspecified chronicity, unspecified gastritis type  K29.70 535.50   2. Chest pain  R07.9 786.50       Disposition:   Disposition: Discharged  Condition: Stable         Torito Maxwell Jr., MD  04/27/24 3626

## 2024-04-17 LAB
OHS QRS DURATION: 72 MS
OHS QTC CALCULATION: 406 MS

## 2024-07-25 ENCOUNTER — HOSPITAL ENCOUNTER (EMERGENCY)
Facility: HOSPITAL | Age: 54
Discharge: HOME | End: 2024-07-26
Attending: EMERGENCY MEDICINE
Payer: MEDICAID

## 2024-07-25 VITALS
BODY MASS INDEX: 34.23 KG/M2 | OXYGEN SATURATION: 97 % | TEMPERATURE: 99 F | SYSTOLIC BLOOD PRESSURE: 141 MMHG | DIASTOLIC BLOOD PRESSURE: 84 MMHG | HEART RATE: 74 BPM | RESPIRATION RATE: 18 BRPM | WEIGHT: 142 LBS

## 2024-07-25 DIAGNOSIS — R10.9 RIGHT SIDED ABDOMINAL PAIN: Primary | ICD-10-CM

## 2024-07-25 LAB
BASOPHILS # BLD AUTO: 0.05 K/UL (ref 0–0.2)
BASOPHILS NFR BLD: 0.6 % (ref 0–1.9)
BILIRUB UR QL STRIP: NEGATIVE
CLARITY UR: CLEAR
COLOR UR: YELLOW
DIFFERENTIAL METHOD BLD: NORMAL
EOSINOPHIL # BLD AUTO: 0.3 K/UL (ref 0–0.5)
EOSINOPHIL NFR BLD: 3.3 % (ref 0–8)
ERYTHROCYTE [DISTWIDTH] IN BLOOD BY AUTOMATED COUNT: 13.3 % (ref 11.5–14.5)
GLUCOSE UR QL STRIP: NEGATIVE
HCT VFR BLD AUTO: 41.8 % (ref 37–48.5)
HEP C VIRUS HOLD SPECIMEN: NORMAL
HGB BLD-MCNC: 13.9 G/DL (ref 12–16)
HGB UR QL STRIP: NEGATIVE
IMM GRANULOCYTES # BLD AUTO: 0.02 K/UL (ref 0–0.04)
IMM GRANULOCYTES NFR BLD AUTO: 0.2 % (ref 0–0.5)
KETONES UR QL STRIP: NEGATIVE
LEUKOCYTE ESTERASE UR QL STRIP: NEGATIVE
LYMPHOCYTES # BLD AUTO: 2.6 K/UL (ref 1–4.8)
LYMPHOCYTES NFR BLD: 31.8 % (ref 18–48)
MCH RBC QN AUTO: 27.3 PG (ref 27–31)
MCHC RBC AUTO-ENTMCNC: 33.3 G/DL (ref 32–36)
MCV RBC AUTO: 82 FL (ref 82–98)
MONOCYTES # BLD AUTO: 0.5 K/UL (ref 0.3–1)
MONOCYTES NFR BLD: 6.1 % (ref 4–15)
NEUTROPHILS # BLD AUTO: 4.7 K/UL (ref 1.8–7.7)
NEUTROPHILS NFR BLD: 58 % (ref 38–73)
NITRITE UR QL STRIP: NEGATIVE
NRBC BLD-RTO: 0 /100 WBC
PH UR STRIP: 6 [PH] (ref 5–8)
PLATELET # BLD AUTO: 228 K/UL (ref 150–450)
PMV BLD AUTO: 10.1 FL (ref 9.2–12.9)
PROT UR QL STRIP: NEGATIVE
RBC # BLD AUTO: 5.09 M/UL (ref 4–5.4)
SP GR UR STRIP: 1.02 (ref 1–1.03)
URN SPEC COLLECT METH UR: NORMAL
UROBILINOGEN UR STRIP-ACNC: NEGATIVE EU/DL
WBC # BLD AUTO: 8.07 K/UL (ref 3.9–12.7)

## 2024-07-25 PROCEDURE — 81003 URINALYSIS AUTO W/O SCOPE: CPT

## 2024-07-25 PROCEDURE — 96375 TX/PRO/DX INJ NEW DRUG ADDON: CPT

## 2024-07-25 PROCEDURE — 87389 HIV-1 AG W/HIV-1&-2 AB AG IA: CPT | Performed by: EMERGENCY MEDICINE

## 2024-07-25 PROCEDURE — 96374 THER/PROPH/DIAG INJ IV PUSH: CPT

## 2024-07-25 PROCEDURE — 80053 COMPREHEN METABOLIC PANEL: CPT | Performed by: EMERGENCY MEDICINE

## 2024-07-25 PROCEDURE — 63600175 PHARM REV CODE 636 W HCPCS

## 2024-07-25 PROCEDURE — 25000003 PHARM REV CODE 250

## 2024-07-25 PROCEDURE — 99285 EMERGENCY DEPT VISIT HI MDM: CPT | Mod: 25

## 2024-07-25 PROCEDURE — 86803 HEPATITIS C AB TEST: CPT | Performed by: EMERGENCY MEDICINE

## 2024-07-25 PROCEDURE — 85025 COMPLETE CBC W/AUTO DIFF WBC: CPT

## 2024-07-25 PROCEDURE — 96361 HYDRATE IV INFUSION ADD-ON: CPT

## 2024-07-25 RX ORDER — ONDANSETRON HYDROCHLORIDE 2 MG/ML
4 INJECTION, SOLUTION INTRAVENOUS
Status: COMPLETED | OUTPATIENT
Start: 2024-07-25 | End: 2024-07-25

## 2024-07-25 RX ORDER — KETOROLAC TROMETHAMINE 30 MG/ML
15 INJECTION, SOLUTION INTRAMUSCULAR; INTRAVENOUS
Status: COMPLETED | OUTPATIENT
Start: 2024-07-25 | End: 2024-07-25

## 2024-07-25 RX ADMIN — SODIUM CHLORIDE 1000 ML: 9 INJECTION, SOLUTION INTRAVENOUS at 10:07

## 2024-07-25 RX ADMIN — KETOROLAC TROMETHAMINE 15 MG: 30 INJECTION, SOLUTION INTRAMUSCULAR at 10:07

## 2024-07-25 RX ADMIN — ONDANSETRON 4 MG: 2 INJECTION INTRAMUSCULAR; INTRAVENOUS at 10:07

## 2024-07-26 LAB
ALBUMIN SERPL BCP-MCNC: 3.4 G/DL (ref 3.5–5.2)
ALP SERPL-CCNC: 119 U/L (ref 55–135)
ALT SERPL W/O P-5'-P-CCNC: 18 U/L (ref 10–44)
ANION GAP SERPL CALC-SCNC: 9 MMOL/L (ref 8–16)
AST SERPL-CCNC: 14 U/L (ref 10–40)
BILIRUB SERPL-MCNC: 0.4 MG/DL (ref 0.1–1)
BUN SERPL-MCNC: 11 MG/DL (ref 6–20)
CALCIUM SERPL-MCNC: 8.8 MG/DL (ref 8.7–10.5)
CHLORIDE SERPL-SCNC: 112 MMOL/L (ref 95–110)
CO2 SERPL-SCNC: 20 MMOL/L (ref 23–29)
CREAT SERPL-MCNC: 0.6 MG/DL (ref 0.5–1.4)
EST. GFR  (NO RACE VARIABLE): >60 ML/MIN/1.73 M^2
GLUCOSE SERPL-MCNC: 97 MG/DL (ref 70–110)
HCV AB SERPL QL IA: NEGATIVE
HIV 1+2 AB+HIV1 P24 AG SERPL QL IA: NEGATIVE
POTASSIUM SERPL-SCNC: 3.6 MMOL/L (ref 3.5–5.1)
PROT SERPL-MCNC: 6.3 G/DL (ref 6–8.4)
SODIUM SERPL-SCNC: 141 MMOL/L (ref 136–145)

## 2024-07-26 RX ORDER — ONDANSETRON 4 MG/1
4 TABLET, FILM COATED ORAL EVERY 6 HOURS PRN
Qty: 12 TABLET | Refills: 0 | Status: SHIPPED | OUTPATIENT
Start: 2024-07-26

## 2024-07-26 RX ORDER — HYDROCODONE BITARTRATE AND ACETAMINOPHEN 5; 325 MG/1; MG/1
1 TABLET ORAL EVERY 6 HOURS PRN
Qty: 9 TABLET | Refills: 0 | Status: SHIPPED | OUTPATIENT
Start: 2024-07-26

## 2024-07-26 NOTE — ED PROVIDER NOTES
"SCRIBE #1 NOTE: I, Zeenat Suarez, am scribing for, and in the presence of, Santiago Waller Jr., MD. I have scribed the entire note.       History     Chief Complaint   Patient presents with    Abdominal Pain     Lower right quadrant abdominal pain since this am with N/V. Denies difficulty with urination or vaginal discharge. + rebound tenderness.     Review of patient's allergies indicates:   Allergen Reactions    Demerol [meperidine]      "I'm just sensitive to it."    Latex, natural rubber Rash         History of Present Illness     HPI    2024, 11:04 PM  History obtained from the patient      History of Present Illness: Penny Pereira is a 54 y.o. female patient who presents to the Emergency Department for evaluation of abdominal pain which onset when she woke up this morning. Pt reports history of three  surgeries. Symptoms are intermittent and moderate in severity. No mitigating or exacerbating factors reported. Associated sxs include nausea and decreased appetite. Patient denies any vomiting, back pain, and all other sxs at this time. No prior tx reported.  No further complaints or concerns at this time.       Arrival mode: Personal vehicle      PCP: Rob Price MD        Past Medical History:  Past Medical History:   Diagnosis Date    Asthma     Depression     ICH (intracerebral hemorrhage)     Migraine headache     Osteogenesis imperfecta        Past Surgical History:  Past Surgical History:   Procedure Laterality Date    BRAIN SURGERY       SECTION      x 3    HYSTERECTOMY      LEG SURGERY Bilateral          Family History:  Family History   Problem Relation Name Age of Onset    Ovarian cancer Mother      No Known Problems Father      No Known Problems Sister      No Known Problems Sister         Social History:  Social History     Tobacco Use    Smoking status: Never    Smokeless tobacco: Never   Substance and Sexual Activity    Alcohol use: No    Drug use: No    Sexual " activity: Yes     Partners: Male        Review of Systems     Review of Systems   Constitutional:  Positive for appetite change.   Gastrointestinal:  Positive for abdominal pain and nausea. Negative for vomiting.   Musculoskeletal:  Negative for back pain.      Physical Exam     Initial Vitals [07/25/24 2028]   BP Pulse Resp Temp SpO2   (!) 141/81 99 18 98.4 °F (36.9 °C) 96 %      MAP       --          Physical Exam  Nursing Notes and Vital Signs Reviewed.  Constitutional: Patient is in no acute distress. Well-developed and well-nourished.  Head: Atraumatic. Normocephalic.  Eyes:  EOM intact.  No scleral icterus.  ENT: Mucous membranes are moist.  Nares clear   Neck:  Full ROM. No JVD.  Cardiovascular: Regular rate. Regular rhythm No murmurs, rubs, or gallops. Distal pulses are 2+ and symmetric  Pulmonary/Chest: No respiratory distress. Clear to auscultation bilaterally. No wheezing or rales.  Equal chest wall rise bilaterally  Abdominal: Soft and non-distended.  Mild diffuse tenderness.  This is greatest in the right upper quadrant.  Equivocal nerve Joseph's.  No tenderness at McBurney's.  No rebound, guarding, or rigidity. Good bowel sounds.  Genitourinary: No CVA tenderness.  No suprapubic tenderness  Musculoskeletal: Moves all extremities. No obvious deformities.  5 x 5 strength in all extremities   Skin: Warm and dry.  Neurological:  Alert, awake, and appropriate.  Normal speech.  No acute focal neurological deficits are appreciated.  Two through 12 intact bilaterally.  Psychiatric: Normal affect. Good eye contact. Appropriate in content.     ED Course   Procedures  ED Vital Signs:  Vitals:    07/25/24 2028 07/25/24 2340   BP: (!) 141/81 (!) 141/84   Pulse: 99 74   Resp: 18 18   Temp: 98.4 °F (36.9 °C) 98.5 °F (36.9 °C)   TempSrc: Oral Oral   SpO2: 96% 97%   Weight: 64.4 kg (141 lb 15.6 oz)        Abnormal Lab Results:  Labs Reviewed   COMPREHENSIVE METABOLIC PANEL - Abnormal       Result Value    Sodium 141       Potassium 3.6      Chloride 112 (*)     CO2 20 (*)     Glucose 97      BUN 11      Creatinine 0.6      Calcium 8.8      Total Protein 6.3      Albumin 3.4 (*)     Total Bilirubin 0.4      Alkaline Phosphatase 119      AST 14      ALT 18      eGFR >60      Anion Gap 9      Narrative:     Release to patient->Immediate   CBC W/ AUTO DIFFERENTIAL    WBC 8.07      RBC 5.09      Hemoglobin 13.9      Hematocrit 41.8      MCV 82      MCH 27.3      MCHC 33.3      RDW 13.3      Platelets 228      MPV 10.1      Immature Granulocytes 0.2      Gran # (ANC) 4.7      Immature Grans (Abs) 0.02      Lymph # 2.6      Mono # 0.5      Eos # 0.3      Baso # 0.05      nRBC 0      Gran % 58.0      Lymph % 31.8      Mono % 6.1      Eosinophil % 3.3      Basophil % 0.6      Differential Method Automated      Narrative:     Release to patient->Immediate   URINALYSIS, REFLEX TO URINE CULTURE    Specimen UA Urine, Clean Catch      Color, UA Yellow      Appearance, UA Clear      pH, UA 6.0      Specific Gravity, UA 1.025      Protein, UA Negative      Glucose, UA Negative      Ketones, UA Negative      Bilirubin (UA) Negative      Occult Blood UA Negative      Nitrite, UA Negative      Urobilinogen, UA Negative      Leukocytes, UA Negative      Narrative:     Specimen Source->Urine   HIV 1 / 2 ANTIBODY    HIV 1/2 Ag/Ab Negative      Narrative:     Release to patient->Immediate   HEPATITIS C ANTIBODY    Hepatitis C Ab Negative      Narrative:     Release to patient->Immediate   HEP C VIRUS HOLD SPECIMEN    HEP C Virus Hold Specimen Hold for HCV sendout      Narrative:     Release to patient->Immediate        All Lab Results:  Results for orders placed or performed during the hospital encounter of 07/25/24   CBC auto differential   Result Value Ref Range    WBC 8.07 3.90 - 12.70 K/uL    RBC 5.09 4.00 - 5.40 M/uL    Hemoglobin 13.9 12.0 - 16.0 g/dL    Hematocrit 41.8 37.0 - 48.5 %    MCV 82 82 - 98 fL    MCH 27.3 27.0 - 31.0 pg    MCHC 33.3  32.0 - 36.0 g/dL    RDW 13.3 11.5 - 14.5 %    Platelets 228 150 - 450 K/uL    MPV 10.1 9.2 - 12.9 fL    Immature Granulocytes 0.2 0.0 - 0.5 %    Gran # (ANC) 4.7 1.8 - 7.7 K/uL    Immature Grans (Abs) 0.02 0.00 - 0.04 K/uL    Lymph # 2.6 1.0 - 4.8 K/uL    Mono # 0.5 0.3 - 1.0 K/uL    Eos # 0.3 0.0 - 0.5 K/uL    Baso # 0.05 0.00 - 0.20 K/uL    nRBC 0 0 /100 WBC    Gran % 58.0 38.0 - 73.0 %    Lymph % 31.8 18.0 - 48.0 %    Mono % 6.1 4.0 - 15.0 %    Eosinophil % 3.3 0.0 - 8.0 %    Basophil % 0.6 0.0 - 1.9 %    Differential Method Automated    Urinalysis, Reflex to Urine Culture Urine, Clean Catch    Specimen: Urine   Result Value Ref Range    Specimen UA Urine, Clean Catch     Color, UA Yellow Yellow, Straw, Mary Ann    Appearance, UA Clear Clear    pH, UA 6.0 5.0 - 8.0    Specific Gravity, UA 1.025 1.005 - 1.030    Protein, UA Negative Negative    Glucose, UA Negative Negative    Ketones, UA Negative Negative    Bilirubin (UA) Negative Negative    Occult Blood UA Negative Negative    Nitrite, UA Negative Negative    Urobilinogen, UA Negative <2.0 EU/dL    Leukocytes, UA Negative Negative   Comprehensive metabolic panel   Result Value Ref Range    Sodium 141 136 - 145 mmol/L    Potassium 3.6 3.5 - 5.1 mmol/L    Chloride 112 (H) 95 - 110 mmol/L    CO2 20 (L) 23 - 29 mmol/L    Glucose 97 70 - 110 mg/dL    BUN 11 6 - 20 mg/dL    Creatinine 0.6 0.5 - 1.4 mg/dL    Calcium 8.8 8.7 - 10.5 mg/dL    Total Protein 6.3 6.0 - 8.4 g/dL    Albumin 3.4 (L) 3.5 - 5.2 g/dL    Total Bilirubin 0.4 0.1 - 1.0 mg/dL    Alkaline Phosphatase 119 55 - 135 U/L    AST 14 10 - 40 U/L    ALT 18 10 - 44 U/L    eGFR >60 >60 mL/min/1.73 m^2    Anion Gap 9 8 - 16 mmol/L   HIV 1/2 Ag/Ab (4th Gen)   Result Value Ref Range    HIV 1/2 Ag/Ab Negative Negative   Hepatitis C Antibody   Result Value Ref Range    Hepatitis C Ab Negative Negative   HCV Virus Hold Specimen   Result Value Ref Range    HEP C Virus Hold Specimen Hold for HCV sendout          Imaging  Results:  Imaging Results              US Abdomen Limited (Final result)  Result time 07/26/24 00:18:06      Final result by Wale Roberson MD (07/26/24 00:18:06)                   Impression:      No sonographic evidence of cholelithiasis or acute cholecystitis.    Hepatic steatosis.      Electronically signed by: Wale Roberson MD  Date:    07/26/2024  Time:    00:18               Narrative:    EXAMINATION:  US ABDOMEN LIMITED    CLINICAL HISTORY:  ruq pain;    TECHNIQUE:  Limited ultrasound of the right upper quadrant of the abdomen (including pancreas, liver, gallbladder, common bile duct, and spleen) was performed.    COMPARISON:  CT 07/25/2024    FINDINGS:  Liver: There is diffuse increased echogenicity of the visualized hepatic parenchyma suggesting hepatic steatosis.    Gallbladder: No calculi, wall thickening, or pericholecystic fluid.  No sonographic Joseph's sign.    Biliary system: The common duct is not dilated, measuring 3 mm.  No intrahepatic ductal dilatation.    Pancreas: The visualized portion appears within normal limits.    Right kidney: No evidence of hydronephrosis.    Miscellaneous: No upper abdominal ascites.                                       CT Renal Stone Study ABD Pelvis WO (Final result)  Result time 07/25/24 21:25:12      Final result by Jaida Jasmine MD (07/25/24 21:25:12)                   Impression:      No obstructive uropathy      Electronically signed by: Jaida Jasmine  Date:    07/25/2024  Time:    21:25               Narrative:    EXAMINATION:  CT RENAL STONE STUDY ABD PELVIS WO    CLINICAL HISTORY:  Flank pain, kidney stone suspected;    TECHNIQUE:  Low dose axial images, sagittal and coronal reformations were obtained from the lung bases to the pubic symphysis.  Contrast was not administered.    COMPARISON:  None    FINDINGS:  Kidneys without hydronephrosis or sizable calcification.  No hydroureter.  Urinary bladder decompressed    Unenhanced liver  pancreas and spleen unremarkable.  Gallbladder present    No obstructive or inflammatory bowel findings the                                              The Emergency Provider reviewed the vital signs and test results, which are outlined above.     ED Discussion     12:28 AM: Reassessed pt at this time. Discussed with pt all pertinent ED information and results. Discussed pt dx and plan of tx. Gave pt all f/u and return to the ED instructions. All questions and concerns were addressed at this time. Pt expresses understanding of information and instructions, and is comfortable with plan to discharge. Pt is stable for discharge.    I discussed with patient and/or family/caretaker that evaluation in the ED does not suggest any emergent or life threatening medical conditions requiring immediate intervention beyond what was provided in the ED, and I believe patient is safe for discharge.  Regardless, an unremarkable evaluation in the ED does not preclude the development or presence of a serious of life threatening condition. As such, patient was instructed to return immediately for any worsening or change in current symptoms.         Medical Decision Making  Differential diagnosis:  Kidney stone, appendicitis, flank pain, gallstones, constipation, abdominal pain    Patient was evaluated history and physical examination.  She was complaining of right-sided abdominal pain with some tenderness and right upper quadrant.  This is very minimal however she was negative Joseph's.  Workup is negative.  There is no evidence of appendicitis or other inflammatory changes.  He was no kidney stone.  Will treat with pain medication at home with close follow up with the primary care provider.  I have advised her to have her abdomen rechecked tomorrow and return as needed for any worsening symptoms, problems, questions or concerns.  Patient was seems reliable and verbalized her agreement and understanding with all instructions    Amount  and/or Complexity of Data Reviewed  Labs: ordered. Decision-making details documented in ED Course.     Details: HIV and hepatitis negative.  UA and UDS are negative.  Normal white count.  No shift.  She was normal LFTs otherwise benign electrolytes save for CO2 of 20  Radiology: ordered. Decision-making details documented in ED Course.     Details: Ultrasound in his CT are both negative.  No gallbladder disease.  Negative appendicitis    Risk  OTC drugs.  Prescription drug management.  Parenteral controlled substances.  Decision regarding hospitalization.                ED Medication(s):  Medications   sodium chloride 0.9% bolus 1,000 mL 1,000 mL (0 mLs Intravenous Stopped 7/25/24 2332)   ondansetron injection 4 mg (4 mg Intravenous Given 7/25/24 2231)   ketorolac injection 15 mg (15 mg Intravenous Given 7/25/24 2231)       New Prescriptions    HYDROCODONE-ACETAMINOPHEN (NORCO) 5-325 MG PER TABLET    Take 1 tablet by mouth every 6 (six) hours as needed.    ONDANSETRON (ZOFRAN) 4 MG TABLET    Take 1 tablet (4 mg total) by mouth every 6 (six) hours as needed for Nausea.        Follow-up Information       Rob Price MD.    Specialty: Family Medicine  Contact information:  [address] [facility] [address]  [facility] [address]  [phone]                                 Scribe Attestation:   Scribe #1: I performed the above scribed service and the documentation accurately describes the services I performed. I attest to the accuracy of the note.     Attending:   Physician Attestation Statement for Scribe #1: I, Santiago Waller Jr., MD, personally performed the services described in this documentation, as scribed by Zeenat Suarez, in my presence, and it is both accurate and complete.           Clinical Impression       ICD-10-CM ICD-9-CM   1. Right sided abdominal pain  R10.9 789.09       Disposition:   Disposition: Discharged  Condition: Stable         Santiago Waller Jr., MD  07/26/24 0155

## 2024-07-26 NOTE — FIRST PROVIDER EVALUATION
Medical screening examination initiated.  I have conducted a focused provider triage encounter, findings are as follows:    Brief history of present illness:  Right-sided abdominal pain that radiates to the right flank, onset this morning, reports nausea and vomiting.    There were no vitals filed for this visit.    Pertinent physical exam:  Right-sided flank pain with nausea and vomiting.    Brief workup plan:  Workup, renal stone rule out.    Preliminary workup initiated; this workup will be continued and followed by the physician or advanced practice provider that is assigned to the patient when roomed.

## 2024-07-26 NOTE — DISCHARGE INSTRUCTIONS
It was unclear why you are having your pain in your abdomen.  This may be a stomach virus or something of that sort.  Use ibuprofen for pain, Norco for breakthrough pain and Zofran for nausea.  Have her abdomen re-examined 24 hours by your doctor return as needed for any worsening symptoms, problems, questions or concerns

## 2024-09-14 NOTE — TELEPHONE ENCOUNTER
Patient escalated to COVID-19 Surveillance Program que for no response. Called patient to obtain signs. Vitals signs did not escalate additional triage call. Patient stated she could not find where to enter the information on my chart. She just signed the consent this morning at 10:52am so I told her that may be why she did not receive the 8am notification. Advised patient to check for the afternoon notification and if she still is not receiving we will look into the situation and she may need to call tech support.  Patient denied any new or worsening symptoms.  Will continue to monitor at next push.     Sats: 97  HR: 75  Temp: 98.1  Fever symptoms: no  Cough: yes, not worsening   SOB rest: 0  SOB activity: 2    Reason for Disposition   Health Information question, no triage required and triager able to answer question    Protocols used: INFORMATION ONLY CALL - NO TRIAGE-[facility]       no concerns